# Patient Record
Sex: MALE | Race: BLACK OR AFRICAN AMERICAN | NOT HISPANIC OR LATINO | URBAN - METROPOLITAN AREA
[De-identification: names, ages, dates, MRNs, and addresses within clinical notes are randomized per-mention and may not be internally consistent; named-entity substitution may affect disease eponyms.]

---

## 2023-03-20 DIAGNOSIS — N40.1 BENIGN PROSTATIC HYPERPLASIA WITH LOWER URINARY TRACT SYMPTOMS: ICD-10-CM

## 2023-03-23 RX ORDER — TAMSULOSIN HYDROCHLORIDE 0.4 MG/1
CAPSULE ORAL
Qty: 30 CAPSULE | Refills: 4 | Status: SHIPPED | OUTPATIENT
Start: 2023-03-23 | End: 2024-01-18 | Stop reason: HOSPADM

## 2023-04-25 LAB
ALANINE AMINOTRANSFERASE (SGPT) (U/L) IN SER/PLAS: 11 U/L (ref 10–52)
ALBUMIN (G/DL) IN SER/PLAS: 3.5 G/DL (ref 3.4–5)
ALKALINE PHOSPHATASE (U/L) IN SER/PLAS: 67 U/L (ref 33–136)
ANION GAP IN SER/PLAS: 11 MMOL/L (ref 10–20)
ASPARTATE AMINOTRANSFERASE (SGOT) (U/L) IN SER/PLAS: 11 U/L (ref 9–39)
BILIRUBIN TOTAL (MG/DL) IN SER/PLAS: 0.6 MG/DL (ref 0–1.2)
CALCIUM (MG/DL) IN SER/PLAS: 8.3 MG/DL (ref 8.6–10.3)
CARBON DIOXIDE, TOTAL (MMOL/L) IN SER/PLAS: 27 MMOL/L (ref 21–32)
CHLORIDE (MMOL/L) IN SER/PLAS: 105 MMOL/L (ref 98–107)
CHOLESTEROL (MG/DL) IN SER/PLAS: 130 MG/DL (ref 0–199)
CHOLESTEROL IN HDL (MG/DL) IN SER/PLAS: 44.6 MG/DL
CHOLESTEROL/HDL RATIO: 2.9
CREATININE (MG/DL) IN SER/PLAS: 0.82 MG/DL (ref 0.5–1.3)
ERYTHROCYTE DISTRIBUTION WIDTH (RATIO) BY AUTOMATED COUNT: 12.4 % (ref 11.5–14.5)
ERYTHROCYTE MEAN CORPUSCULAR HEMOGLOBIN CONCENTRATION (G/DL) BY AUTOMATED: 32.2 G/DL (ref 32–36)
ERYTHROCYTE MEAN CORPUSCULAR VOLUME (FL) BY AUTOMATED COUNT: 86 FL (ref 80–100)
ERYTHROCYTES (10*6/UL) IN BLOOD BY AUTOMATED COUNT: 4.94 X10E12/L (ref 4.5–5.9)
GFR MALE: >90 ML/MIN/1.73M2
GLUCOSE (MG/DL) IN SER/PLAS: 165 MG/DL (ref 74–99)
HEMATOCRIT (%) IN BLOOD BY AUTOMATED COUNT: 42.5 % (ref 41–52)
HEMOGLOBIN (G/DL) IN BLOOD: 13.7 G/DL (ref 13.5–17.5)
LDL: 74 MG/DL (ref 0–99)
LEUKOCYTES (10*3/UL) IN BLOOD BY AUTOMATED COUNT: 5.9 X10E9/L (ref 4.4–11.3)
PLATELETS (10*3/UL) IN BLOOD AUTOMATED COUNT: 193 X10E9/L (ref 150–450)
POTASSIUM (MMOL/L) IN SER/PLAS: 4 MMOL/L (ref 3.5–5.3)
PROTEIN TOTAL: 6.7 G/DL (ref 6.4–8.2)
SODIUM (MMOL/L) IN SER/PLAS: 139 MMOL/L (ref 136–145)
TRIGLYCERIDE (MG/DL) IN SER/PLAS: 59 MG/DL (ref 0–149)
UREA NITROGEN (MG/DL) IN SER/PLAS: 14 MG/DL (ref 6–23)
VLDL: 12 MG/DL (ref 0–40)

## 2023-04-28 ENCOUNTER — OFFICE VISIT (OUTPATIENT)
Dept: PRIMARY CARE | Facility: CLINIC | Age: 63
End: 2023-04-28
Payer: COMMERCIAL

## 2023-04-28 VITALS
WEIGHT: 315 LBS | BODY MASS INDEX: 41.75 KG/M2 | SYSTOLIC BLOOD PRESSURE: 134 MMHG | HEART RATE: 70 BPM | OXYGEN SATURATION: 97 % | HEIGHT: 73 IN | DIASTOLIC BLOOD PRESSURE: 76 MMHG

## 2023-04-28 DIAGNOSIS — G47.33 OBSTRUCTIVE SLEEP APNEA: ICD-10-CM

## 2023-04-28 DIAGNOSIS — I48.21 PERMANENT ATRIAL FIBRILLATION (MULTI): ICD-10-CM

## 2023-04-28 DIAGNOSIS — G89.29 CHRONIC LEFT HIP PAIN: Primary | ICD-10-CM

## 2023-04-28 DIAGNOSIS — E66.01 CLASS 3 SEVERE OBESITY DUE TO EXCESS CALORIES WITH SERIOUS COMORBIDITY AND BODY MASS INDEX (BMI) OF 50.0 TO 59.9 IN ADULT (MULTI): ICD-10-CM

## 2023-04-28 DIAGNOSIS — I82.4Y2 DEEP VEIN THROMBOSIS (DVT) OF PROXIMAL VEIN OF LEFT LOWER EXTREMITY, UNSPECIFIED CHRONICITY (MULTI): ICD-10-CM

## 2023-04-28 DIAGNOSIS — I10 HYPERTENSION, UNSPECIFIED TYPE: ICD-10-CM

## 2023-04-28 DIAGNOSIS — M25.552 CHRONIC LEFT HIP PAIN: Primary | ICD-10-CM

## 2023-04-28 DIAGNOSIS — E11.40 TYPE 2 DIABETES MELLITUS WITH DIABETIC NEUROPATHY, WITHOUT LONG-TERM CURRENT USE OF INSULIN (MULTI): ICD-10-CM

## 2023-04-28 DIAGNOSIS — N40.0 BENIGN PROSTATIC HYPERPLASIA, UNSPECIFIED WHETHER LOWER URINARY TRACT SYMPTOMS PRESENT: ICD-10-CM

## 2023-04-28 DIAGNOSIS — E78.5 HYPERLIPIDEMIA, UNSPECIFIED HYPERLIPIDEMIA TYPE: ICD-10-CM

## 2023-04-28 PROBLEM — I82.402 LEFT LEG DVT (MULTI): Status: ACTIVE | Noted: 2023-04-28

## 2023-04-28 PROBLEM — E66.813 CLASS 3 SEVERE OBESITY WITH SERIOUS COMORBIDITY AND BODY MASS INDEX (BMI) OF 50.0 TO 59.9 IN ADULT: Status: ACTIVE | Noted: 2023-04-28

## 2023-04-28 LAB
ALBUMIN (MG/L) IN URINE: <7 MG/L
ALBUMIN/CREATININE (UG/MG) IN URINE: NORMAL UG/MG CRT (ref 0–30)
CREATININE (MG/DL) IN URINE: 123 MG/DL (ref 20–370)

## 2023-04-28 PROCEDURE — 82043 UR ALBUMIN QUANTITATIVE: CPT

## 2023-04-28 PROCEDURE — 3075F SYST BP GE 130 - 139MM HG: CPT | Performed by: INTERNAL MEDICINE

## 2023-04-28 PROCEDURE — 3008F BODY MASS INDEX DOCD: CPT | Performed by: INTERNAL MEDICINE

## 2023-04-28 PROCEDURE — 3078F DIAST BP <80 MM HG: CPT | Performed by: INTERNAL MEDICINE

## 2023-04-28 PROCEDURE — 1036F TOBACCO NON-USER: CPT | Performed by: INTERNAL MEDICINE

## 2023-04-28 PROCEDURE — 99214 OFFICE O/P EST MOD 30 MIN: CPT | Performed by: INTERNAL MEDICINE

## 2023-04-28 PROCEDURE — 82570 ASSAY OF URINE CREATININE: CPT

## 2023-04-28 RX ORDER — LANCETS
EACH MISCELLANEOUS
COMMUNITY
Start: 2022-07-19

## 2023-04-28 RX ORDER — ATORVASTATIN CALCIUM 40 MG/1
40 TABLET, FILM COATED ORAL DAILY
COMMUNITY
End: 2023-04-28 | Stop reason: SDUPTHER

## 2023-04-28 RX ORDER — TERAZOSIN 2 MG/1
2 CAPSULE ORAL NIGHTLY
COMMUNITY
Start: 2022-07-19 | End: 2023-04-28 | Stop reason: SDUPTHER

## 2023-04-28 RX ORDER — CARVEDILOL PHOSPHATE 10 MG/1
10 CAPSULE, EXTENDED RELEASE ORAL DAILY
Qty: 90 CAPSULE | Refills: 3 | Status: SHIPPED | OUTPATIENT
Start: 2023-04-28 | End: 2024-02-13 | Stop reason: ALTCHOICE

## 2023-04-28 RX ORDER — CARVEDILOL PHOSPHATE 10 MG/1
1 CAPSULE, EXTENDED RELEASE ORAL DAILY
COMMUNITY
End: 2023-04-28 | Stop reason: SDUPTHER

## 2023-04-28 RX ORDER — LANCETS 30 GAUGE
EACH MISCELLANEOUS
COMMUNITY
Start: 2022-07-20

## 2023-04-28 RX ORDER — BLOOD SUGAR DIAGNOSTIC
STRIP MISCELLANEOUS
COMMUNITY
Start: 2022-07-19

## 2023-04-28 RX ORDER — RIVAROXABAN 20 MG/1
20 TABLET, FILM COATED ORAL
Qty: 90 TABLET | Refills: 3 | Status: SHIPPED | OUTPATIENT
Start: 2023-04-28 | End: 2024-04-22 | Stop reason: SDUPTHER

## 2023-04-28 RX ORDER — GLIPIZIDE 5 MG/1
5 TABLET ORAL
Qty: 360 TABLET | Refills: 2 | Status: SHIPPED | OUTPATIENT
Start: 2023-04-28 | End: 2023-04-28 | Stop reason: SDUPTHER

## 2023-04-28 RX ORDER — RIVAROXABAN 20 MG/1
1 TABLET, FILM COATED ORAL
COMMUNITY
Start: 2017-10-04 | End: 2023-04-28 | Stop reason: SDUPTHER

## 2023-04-28 RX ORDER — ATORVASTATIN CALCIUM 40 MG/1
40 TABLET, FILM COATED ORAL DAILY
Qty: 90 TABLET | Refills: 2 | Status: SHIPPED | OUTPATIENT
Start: 2023-04-28 | End: 2023-12-07

## 2023-04-28 RX ORDER — TERAZOSIN 2 MG/1
2 CAPSULE ORAL NIGHTLY
Qty: 90 CAPSULE | Refills: 2 | Status: SHIPPED | OUTPATIENT
Start: 2023-04-28 | End: 2023-11-14 | Stop reason: ALTCHOICE

## 2023-04-28 RX ORDER — METOPROLOL TARTRATE 50 MG/1
50 TABLET ORAL 2 TIMES DAILY
Qty: 90 TABLET | Refills: 2 | Status: SHIPPED | OUTPATIENT
Start: 2023-04-28 | End: 2023-08-14

## 2023-04-28 RX ORDER — GLIPIZIDE 5 MG/1
1 TABLET ORAL 2 TIMES DAILY
COMMUNITY
Start: 2022-07-19 | End: 2023-04-28 | Stop reason: DRUGHIGH

## 2023-04-28 RX ORDER — METOPROLOL TARTRATE 50 MG/1
1 TABLET ORAL 2 TIMES DAILY
COMMUNITY
Start: 2021-09-16 | End: 2023-04-28 | Stop reason: SDUPTHER

## 2023-04-28 RX ORDER — GLIPIZIDE 5 MG/1
5 TABLET ORAL
Qty: 360 TABLET | Refills: 2 | Status: SHIPPED | OUTPATIENT
Start: 2023-04-28 | End: 2024-01-10 | Stop reason: WASHOUT

## 2023-04-28 ASSESSMENT — ENCOUNTER SYMPTOMS
SHORTNESS OF BREATH: 0
CONSTITUTIONAL NEGATIVE: 1
INABILITY TO BEAR WEIGHT: 1
CHEST TIGHTNESS: 0
HIP PAIN: 1
NUMBNESS: 0
MUSCLE WEAKNESS: 0
TINGLING: 0
LOSS OF MOTION: 0
COUGH: 0
PALPITATIONS: 0

## 2023-04-28 ASSESSMENT — PATIENT HEALTH QUESTIONNAIRE - PHQ9
1. LITTLE INTEREST OR PLEASURE IN DOING THINGS: NOT AT ALL
SUM OF ALL RESPONSES TO PHQ9 QUESTIONS 1 AND 2: 0
2. FEELING DOWN, DEPRESSED OR HOPELESS: NOT AT ALL

## 2023-04-28 NOTE — PROGRESS NOTES
"Patient ID: Mitch Connor is a 62 y.o. male who presents for Annual Exam.    /76   Pulse 70   Ht 1.854 m (6' 1\")   Wt (!) 195 kg (429 lb 6.4 oz)   SpO2 97%   BMI 56.65 kg/m²     Hip Pain   Incident onset: FEW YRS. The incident occurred at home. The injury mechanism is unknown. The pain is present in the left hip. The quality of the pain is described as aching. The pain is at a severity of 6/10. The pain is moderate. The pain has been Worsening since onset. Associated symptoms include an inability to bear weight. Pertinent negatives include no loss of motion, muscle weakness, numbness or tingling. He reports no foreign bodies present. The symptoms are aggravated by weight bearing and movement. He has tried acetaminophen and rest for the symptoms. The treatment provided mild relief.       Subjective     Review of Systems   Constitutional: Negative.    Respiratory:  Negative for cough, chest tightness and shortness of breath.    Cardiovascular:  Negative for chest pain, palpitations and leg swelling.   Musculoskeletal:         LEFT HIP PAIN , DIFFICULTY WALKING , AND BEARING WEIGHT , 6/10   PT HAD INTRALESIONAL STEROID APPRXM A YR BEFORE UNDER FLOUROSCOPIC GUIDANCE    Neurological:  Negative for tingling and numbness.   All other systems reviewed and are negative.      Objective     Physical Exam    Lab Results   Component Value Date    WBC 5.9 04/25/2023    HGB 13.7 04/25/2023    HCT 42.5 04/25/2023    MCV 86 04/25/2023     04/25/2023           Problem List Items Addressed This Visit          Nervous    Obstructive sleep apnea    Relevant Orders    Referral to Adult Sleep Medicine    Diabetes mellitus with diabetic neuropathy (CMS/HCC)    Relevant Medications    OneTouch UltraSoft Lancets misc    OneTouch Ultra2 Meter misc    OneTouch Ultra Test strip    glipiZIDE (Glucotrol) 5 mg tablet    Other Relevant Orders    Albumin, urine, random       Circulatory    Permanent atrial fibrillation (CMS/HCC) "    Relevant Medications    metoprolol tartrate (Lopressor) 50 mg tablet    carvedilol CR (Coreg CR) 10 mg 24 hr capsule    Xarelto 20 mg tablet    Left leg DVT (CMS/HCC)    Relevant Medications    Xarelto 20 mg tablet       Musculoskeletal    Chronic left hip pain - Primary    Relevant Orders    Referral to Orthopaedic Surgery       Endocrine/Metabolic    Class 3 severe obesity with serious comorbidity and body mass index (BMI) of 50.0 to 59.9 in adult (CMS/[facility])     Other Visit Diagnoses       Hypertension, unspecified type        Relevant Medications    metoprolol tartrate (Lopressor) 50 mg tablet    carvedilol CR (Coreg CR) 10 mg 24 hr capsule    Benign prostatic hyperplasia, unspecified whether lower urinary tract symptoms present        Relevant Medications    terazosin (Hytrin) 2 mg capsule    Hyperlipidemia, unspecified hyperlipidemia type        Relevant Medications    atorvastatin (Lipitor) 40 mg tablet                 A/P         [facility] DR WENDY HALLMAN   SINCE DIABETES UNCONTROLLED TO INCREASE GLIPIZIDE  5MG   2 PILLS BID FILLED   CARVEDILOL CR 10MG 1 PILL EVERY DAY FILLED  METOPROLOL 50MG 1 PILL BID FILLED   ATORVASTATIN 40MG 1 PILL EVERY DAY FILLED   TERAZOSIN 2MG 2 CAPSULE AT NIGHT FILLED  TAMSULOSIN 0.4NG CAPSULE 1 CAPSULE AT NIGHT FILLED   XARELTO 20MG 1 CAPSULE AT NIGHT FILLED   REFFERAL SLEEP MEDICINE   URINE MICROALBUMIN   Discussed the importance of losing weight as more as morbid obesity  Is a significant risk factor for overall physical and mental health  It has any impact on overall cardiovascular cerebrovascular disease  He already had atrial fibrillation it can aggravate the condition  And could be a risk factor for heart failure and sudden death it can also affect sleep apnea which she already had  is an effect on overall morbidity for cancer  It can also have an effect on his chronic low back pain hip pain knee pain  Discussed the importance of controlling diabetes  uncontrolled diabetes is a significant risk for overall cause morbidity and mortality

## 2023-08-11 DIAGNOSIS — I10 HYPERTENSION, UNSPECIFIED TYPE: ICD-10-CM

## 2023-08-11 DIAGNOSIS — I48.21 PERMANENT ATRIAL FIBRILLATION (MULTI): ICD-10-CM

## 2023-08-14 RX ORDER — METOPROLOL TARTRATE 50 MG/1
50 TABLET ORAL 2 TIMES DAILY
Qty: 180 TABLET | Refills: 1 | Status: SHIPPED | OUTPATIENT
Start: 2023-08-14 | End: 2024-04-22 | Stop reason: SDUPTHER

## 2023-09-25 LAB
ERYTHROCYTE DISTRIBUTION WIDTH (RATIO) BY AUTOMATED COUNT: 12.4 % (ref 11.5–14.5)
ERYTHROCYTE MEAN CORPUSCULAR HEMOGLOBIN CONCENTRATION (G/DL) BY AUTOMATED: 32.7 G/DL (ref 32–36)
ERYTHROCYTE MEAN CORPUSCULAR VOLUME (FL) BY AUTOMATED COUNT: 86 FL (ref 80–100)
ERYTHROCYTES (10*6/UL) IN BLOOD BY AUTOMATED COUNT: 4.88 X10E12/L (ref 4.5–5.9)
HEMATOCRIT (%) IN BLOOD BY AUTOMATED COUNT: 41.9 % (ref 41–52)
HEMOGLOBIN (G/DL) IN BLOOD: 13.7 G/DL (ref 13.5–17.5)
LEUKOCYTES (10*3/UL) IN BLOOD BY AUTOMATED COUNT: 7.1 X10E9/L (ref 4.4–11.3)
PLATELETS (10*3/UL) IN BLOOD AUTOMATED COUNT: 181 X10E9/L (ref 150–450)

## 2023-10-09 ENCOUNTER — LAB (OUTPATIENT)
Dept: LAB | Facility: LAB | Age: 63
End: 2023-10-09
Payer: COMMERCIAL

## 2023-10-09 ENCOUNTER — OFFICE VISIT (OUTPATIENT)
Dept: PRIMARY CARE | Facility: CLINIC | Age: 63
End: 2023-10-09
Payer: COMMERCIAL

## 2023-10-09 VITALS
OXYGEN SATURATION: 97 % | BODY MASS INDEX: 56.31 KG/M2 | HEART RATE: 62 BPM | DIASTOLIC BLOOD PRESSURE: 72 MMHG | SYSTOLIC BLOOD PRESSURE: 126 MMHG | WEIGHT: 315 LBS

## 2023-10-09 DIAGNOSIS — R31.0 GROSS HEMATURIA: ICD-10-CM

## 2023-10-09 DIAGNOSIS — R35.0 BENIGN PROSTATIC HYPERPLASIA WITH URINARY FREQUENCY: ICD-10-CM

## 2023-10-09 DIAGNOSIS — E66.01 CLASS 3 SEVERE OBESITY DUE TO EXCESS CALORIES WITH SERIOUS COMORBIDITY AND BODY MASS INDEX (BMI) OF 50.0 TO 59.9 IN ADULT (MULTI): ICD-10-CM

## 2023-10-09 DIAGNOSIS — N40.1 BENIGN PROSTATIC HYPERPLASIA WITH URINARY FREQUENCY: ICD-10-CM

## 2023-10-09 DIAGNOSIS — N20.0 STAGHORN RENAL CALCULUS: ICD-10-CM

## 2023-10-09 DIAGNOSIS — G47.33 OBSTRUCTIVE SLEEP APNEA: ICD-10-CM

## 2023-10-09 DIAGNOSIS — I82.4Y2 DEEP VEIN THROMBOSIS (DVT) OF PROXIMAL VEIN OF LEFT LOWER EXTREMITY, UNSPECIFIED CHRONICITY (MULTI): ICD-10-CM

## 2023-10-09 DIAGNOSIS — E11.40 TYPE 2 DIABETES MELLITUS WITH DIABETIC NEUROPATHY, WITHOUT LONG-TERM CURRENT USE OF INSULIN (MULTI): ICD-10-CM

## 2023-10-09 DIAGNOSIS — I48.21 PERMANENT ATRIAL FIBRILLATION (MULTI): Primary | ICD-10-CM

## 2023-10-09 PROBLEM — I48.20 CHRONIC ATRIAL FIBRILLATION (MULTI): Status: ACTIVE | Noted: 2023-10-09

## 2023-10-09 LAB
APPEARANCE UR: ABNORMAL
BASOPHILS # BLD AUTO: 0.07 X10*3/UL (ref 0–0.1)
BASOPHILS NFR BLD AUTO: 1.1 %
BILIRUB UR STRIP.AUTO-MCNC: NEGATIVE MG/DL
COLOR UR: ABNORMAL
EOSINOPHIL # BLD AUTO: 0.22 X10*3/UL (ref 0–0.7)
EOSINOPHIL NFR BLD AUTO: 3.4 %
ERYTHROCYTE [DISTWIDTH] IN BLOOD BY AUTOMATED COUNT: 12.3 % (ref 11.5–14.5)
GLUCOSE UR STRIP.AUTO-MCNC: ABNORMAL MG/DL
HCT VFR BLD AUTO: 40.7 % (ref 41–52)
HGB BLD-MCNC: 12.9 G/DL (ref 13.5–17.5)
IMM GRANULOCYTES # BLD AUTO: 0.01 X10*3/UL (ref 0–0.7)
IMM GRANULOCYTES NFR BLD AUTO: 0.2 % (ref 0–0.9)
KETONES UR STRIP.AUTO-MCNC: ABNORMAL MG/DL
LEUKOCYTE ESTERASE UR QL STRIP.AUTO: NEGATIVE
LYMPHOCYTES # BLD AUTO: 1.49 X10*3/UL (ref 1.2–4.8)
LYMPHOCYTES NFR BLD AUTO: 22.8 %
MCH RBC QN AUTO: 28.6 PG (ref 26–34)
MCHC RBC AUTO-ENTMCNC: 31.7 G/DL (ref 32–36)
MCV RBC AUTO: 90 FL (ref 80–100)
MONOCYTES # BLD AUTO: 0.66 X10*3/UL (ref 0.1–1)
MONOCYTES NFR BLD AUTO: 10.1 %
NEUTROPHILS # BLD AUTO: 4.08 X10*3/UL (ref 1.2–7.7)
NEUTROPHILS NFR BLD AUTO: 62.4 %
NITRITE UR QL STRIP.AUTO: POSITIVE
NRBC BLD-RTO: 0 /100 WBCS (ref 0–0)
PH UR STRIP.AUTO: 6 [PH]
PLATELET # BLD AUTO: 160 X10*3/UL (ref 150–450)
PMV BLD AUTO: 12.5 FL (ref 7.5–11.5)
PROT UR STRIP.AUTO-MCNC: ABNORMAL MG/DL
RBC # BLD AUTO: 4.51 X10*6/UL (ref 4.5–5.9)
RBC # UR STRIP.AUTO: ABNORMAL /UL
RBC #/AREA URNS AUTO: >20 /HPF
SP GR UR STRIP.AUTO: 1.02
UROBILINOGEN UR STRIP.AUTO-MCNC: 2 MG/DL
WBC # BLD AUTO: 6.5 X10*3/UL (ref 4.4–11.3)
WBC #/AREA URNS AUTO: ABNORMAL /HPF

## 2023-10-09 PROCEDURE — 85025 COMPLETE CBC W/AUTO DIFF WBC: CPT

## 2023-10-09 PROCEDURE — 99213 OFFICE O/P EST LOW 20 MIN: CPT | Performed by: INTERNAL MEDICINE

## 2023-10-09 PROCEDURE — 3078F DIAST BP <80 MM HG: CPT | Performed by: INTERNAL MEDICINE

## 2023-10-09 PROCEDURE — 81001 URINALYSIS AUTO W/SCOPE: CPT

## 2023-10-09 PROCEDURE — 3008F BODY MASS INDEX DOCD: CPT | Performed by: INTERNAL MEDICINE

## 2023-10-09 PROCEDURE — 36415 COLL VENOUS BLD VENIPUNCTURE: CPT

## 2023-10-09 PROCEDURE — 3074F SYST BP LT 130 MM HG: CPT | Performed by: INTERNAL MEDICINE

## 2023-10-09 PROCEDURE — 1036F TOBACCO NON-USER: CPT | Performed by: INTERNAL MEDICINE

## 2023-10-09 ASSESSMENT — PATIENT HEALTH QUESTIONNAIRE - PHQ9
1. LITTLE INTEREST OR PLEASURE IN DOING THINGS: NOT AT ALL
2. FEELING DOWN, DEPRESSED OR HOPELESS: NOT AT ALL
1. LITTLE INTEREST OR PLEASURE IN DOING THINGS: NOT AT ALL
SUM OF ALL RESPONSES TO PHQ9 QUESTIONS 1 AND 2: 0
SUM OF ALL RESPONSES TO PHQ9 QUESTIONS 1 AND 2: 0
2. FEELING DOWN, DEPRESSED OR HOPELESS: NOT AT ALL

## 2023-10-09 ASSESSMENT — ENCOUNTER SYMPTOMS
HEMATURIA: 1
CONSTITUTIONAL NEGATIVE: 1

## 2023-10-09 NOTE — PROGRESS NOTES
Patient ID: Mitch Connor is a 62 y.o. male who presents for Blood in Urine (For 2-3 months).    /72   Pulse 62   Wt (!) 194 kg (426 lb 12.8 oz)   SpO2 97%   BMI 56.31 kg/m²     Blood in Urine  This is a chronic problem. The current episode started more than 1 month ago. The problem is unchanged.     PT SEEN IN ED ON 08/24/2023 FOR SIMILAR PROBLEM AND NOTED TO HAVE STAGHORN CALCULUS RT KIDNEY , NO HYDRONEPHROSIS , PT SEEN DR [facility]  , AND  TOLD TO HAVE BPH , PT HAVING ONGOING  HEMATURIA , NO DIZZINESS , NO LIGHTHEADEDNESS , I OCCASIONALLY FEEL FATIGUE , PT TALKED TO UROLOGY WHO SUGGESTED TO GET CBC   TOO , PT ON XARELTO , PT HAD CBC ON 09/25/2023 HGB/HEMATOCRIT -13.7/41.9 , HIS BLOOD PRESSURE NORMAL TOO     PT HAS ATRIAL FIBRILLATION TOO ON XARELTO         Subjective     Review of Systems   Constitutional: Negative.    Genitourinary:  Positive for hematuria.   All other systems reviewed and are negative.      Objective     Physical Exam  Vitals and nursing note reviewed.   Cardiovascular:      Rate and Rhythm: Normal rate and regular rhythm.      Pulses: Normal pulses.      Heart sounds: Normal heart sounds.   Pulmonary:      Effort: Pulmonary effort is normal.      Breath sounds: Normal breath sounds.   Musculoskeletal:      Right lower leg: No edema.      Left lower leg: No edema.   Skin:     Capillary Refill: Capillary refill takes more than 3 seconds.   Neurological:      General: No focal deficit present.      Mental Status: He is oriented to person, place, and time. Mental status is at baseline.   Psychiatric:         Mood and Affect: Mood normal.         Behavior: Behavior normal.         Thought Content: Thought content normal.         Judgment: Judgment normal.         Lab Results   Component Value Date    WBC 7.1 09/25/2023    HGB 13.7 09/25/2023    HCT 41.9 09/25/2023    MCV 86 09/25/2023     09/25/2023           Problem List Items Addressed This Visit       Obstructive sleep  apnea    Permanent atrial fibrillation (CMS/HCC) - Primary    Left leg DVT (CMS/HCC)    Class 3 severe obesity with serious comorbidity and body mass index (BMI) of 50.0 to 59.9 in adult (CMS/[facility])    Diabetes mellitus with diabetic neuropathy (CMS/[facility])    Gross hematuria    Relevant Orders    CBC and Auto Differential    Urinalysis with Reflex Microscopic    Urine Culture    Benign prostatic hyperplasia with urinary frequency    Staghorn renal calculus            A/P         CBC, URINALYSIS, URINE CULTURE   I TOLD HIM , IF HE FEELS LIGHT HEADED , DIZZY WITH ,LOW BLOOD PRESSURE , NEED TO ATTEND EMERGENCY RIGHT AWAY

## 2023-10-24 PROBLEM — I82.409 DVT (DEEP VENOUS THROMBOSIS) (MULTI): Status: ACTIVE | Noted: 2023-10-24

## 2023-10-24 PROBLEM — H52.03 HYPERMETROPIA, BILATERAL: Status: ACTIVE | Noted: 2023-10-24

## 2023-10-24 PROBLEM — R25.2 LEG CRAMPS: Status: ACTIVE | Noted: 2023-10-24

## 2023-10-24 PROBLEM — R79.89 LOW TESTOSTERONE IN MALE: Status: ACTIVE | Noted: 2023-10-24

## 2023-10-24 PROBLEM — H47.093 ASYMMETRY OF OPTIC NERVE OF BOTH EYES: Status: ACTIVE | Noted: 2023-10-24

## 2023-10-24 PROBLEM — R82.998 CALCIUM OXALATE CRYSTALS PRESENT IN URINE: Status: ACTIVE | Noted: 2023-10-24

## 2023-10-24 PROBLEM — H52.223 REGULAR ASTIGMATISM OF BOTH EYES WITH PRESBYOPIA: Status: ACTIVE | Noted: 2023-10-24

## 2023-10-24 PROBLEM — E66.01 MORBID OBESITY (MULTI): Status: ACTIVE | Noted: 2023-10-24

## 2023-10-24 PROBLEM — E11.9 DIABETES MELLITUS (MULTI): Status: ACTIVE | Noted: 2023-10-24

## 2023-10-24 PROBLEM — N52.9 ED (ERECTILE DYSFUNCTION): Status: ACTIVE | Noted: 2023-10-24

## 2023-10-24 PROBLEM — K11.7 XEROSTOMIA: Status: ACTIVE | Noted: 2023-10-24

## 2023-10-24 PROBLEM — N40.1 BENIGN PROSTATIC HYPERPLASIA WITH LOWER URINARY TRACT SYMPTOMS: Status: ACTIVE | Noted: 2023-10-24

## 2023-10-24 PROBLEM — R06.83 SNORING: Status: ACTIVE | Noted: 2023-10-24

## 2023-10-24 PROBLEM — H52.4 REGULAR ASTIGMATISM OF BOTH EYES WITH PRESBYOPIA: Status: ACTIVE | Noted: 2023-10-24

## 2023-10-24 PROBLEM — M16.12 PRIMARY OSTEOARTHRITIS OF LEFT HIP: Status: ACTIVE | Noted: 2023-10-24

## 2023-10-24 PROBLEM — R31.9 BLOOD IN URINE: Status: ACTIVE | Noted: 2023-10-24

## 2023-10-24 PROBLEM — Z86.711 HISTORY OF PULMONARY EMBOLISM: Status: ACTIVE | Noted: 2023-10-24

## 2023-10-24 PROBLEM — I10 ESSENTIAL HYPERTENSION: Status: ACTIVE | Noted: 2023-10-24

## 2023-10-24 PROBLEM — N20.0 CALCULUS OF KIDNEY: Status: ACTIVE | Noted: 2023-10-24

## 2023-10-24 PROBLEM — E04.1 THYROID NODULE: Status: ACTIVE | Noted: 2023-10-24

## 2023-10-24 PROBLEM — N32.9 BLADDER DISORDER: Status: ACTIVE | Noted: 2023-10-24

## 2023-10-24 PROBLEM — R91.1 LUNG NODULE: Status: ACTIVE | Noted: 2023-10-24

## 2023-10-24 PROBLEM — E78.5 DYSLIPIDEMIA: Status: ACTIVE | Noted: 2023-10-24

## 2023-10-24 PROBLEM — Z79.01 LONG TERM (CURRENT) USE OF ANTICOAGULANTS: Status: ACTIVE | Noted: 2023-10-24

## 2023-10-24 PROBLEM — H52.222 REGULAR ASTIGMATISM, LEFT EYE: Status: ACTIVE | Noted: 2023-10-24

## 2023-10-24 PROBLEM — R73.9 HYPERGLYCEMIA: Status: ACTIVE | Noted: 2023-10-24

## 2023-10-24 PROBLEM — H40.019 OPEN ANGLE GLAUCOMA SUSPECT WITH BORDERLINE FINDINGS AT LOW RISK: Status: ACTIVE | Noted: 2023-10-24

## 2023-10-24 PROBLEM — I48.92 ATRIAL FLUTTER (MULTI): Status: ACTIVE | Noted: 2023-10-24

## 2023-10-25 ENCOUNTER — APPOINTMENT (OUTPATIENT)
Dept: SLEEP MEDICINE | Facility: CLINIC | Age: 63
End: 2023-10-25
Payer: COMMERCIAL

## 2023-11-14 ENCOUNTER — HOSPITAL ENCOUNTER (OUTPATIENT)
Dept: RADIOLOGY | Facility: HOSPITAL | Age: 63
Discharge: HOME | End: 2023-11-14
Payer: COMMERCIAL

## 2023-11-14 ENCOUNTER — OFFICE VISIT (OUTPATIENT)
Dept: CARDIOLOGY | Facility: HOSPITAL | Age: 63
End: 2023-11-14
Payer: COMMERCIAL

## 2023-11-14 VITALS
BODY MASS INDEX: 40.43 KG/M2 | DIASTOLIC BLOOD PRESSURE: 70 MMHG | WEIGHT: 315 LBS | HEART RATE: 81 BPM | HEIGHT: 74 IN | OXYGEN SATURATION: 96 % | SYSTOLIC BLOOD PRESSURE: 114 MMHG

## 2023-11-14 DIAGNOSIS — R06.09 DYSPNEA ON EXERTION: ICD-10-CM

## 2023-11-14 DIAGNOSIS — I48.20 CHRONIC ATRIAL FIBRILLATION (MULTI): Primary | ICD-10-CM

## 2023-11-14 DIAGNOSIS — R06.2 WHEEZING ON EXPIRATION: ICD-10-CM

## 2023-11-14 PROCEDURE — 93005 ELECTROCARDIOGRAM TRACING: CPT | Performed by: NURSE PRACTITIONER

## 2023-11-14 PROCEDURE — 3078F DIAST BP <80 MM HG: CPT | Performed by: NURSE PRACTITIONER

## 2023-11-14 PROCEDURE — 3008F BODY MASS INDEX DOCD: CPT | Performed by: NURSE PRACTITIONER

## 2023-11-14 PROCEDURE — 71046 X-RAY EXAM CHEST 2 VIEWS: CPT | Performed by: RADIOLOGY

## 2023-11-14 PROCEDURE — 99214 OFFICE O/P EST MOD 30 MIN: CPT | Performed by: NURSE PRACTITIONER

## 2023-11-14 PROCEDURE — 1036F TOBACCO NON-USER: CPT | Performed by: NURSE PRACTITIONER

## 2023-11-14 PROCEDURE — 3074F SYST BP LT 130 MM HG: CPT | Performed by: NURSE PRACTITIONER

## 2023-11-14 PROCEDURE — 71046 X-RAY EXAM CHEST 2 VIEWS: CPT

## 2023-11-14 ASSESSMENT — PATIENT HEALTH QUESTIONNAIRE - PHQ9
SUM OF ALL RESPONSES TO PHQ9 QUESTIONS 1 AND 2: 0
1. LITTLE INTEREST OR PLEASURE IN DOING THINGS: NOT AT ALL
2. FEELING DOWN, DEPRESSED OR HOPELESS: NOT AT ALL

## 2023-11-14 ASSESSMENT — ENCOUNTER SYMPTOMS
OCCASIONAL FEELINGS OF UNSTEADINESS: 1
LOSS OF SENSATION IN FEET: 0
DEPRESSION: 0

## 2023-11-14 NOTE — PROGRESS NOTES
Primary Care Physician: Arthur Bond MD  Date of Visit: 11/14/2023 11:40 AM EST  Location of visit: [facility]     Chief Complaint:   Chief Complaint   Patient presents with    Atrial Fibrillation    Hyperlipidemia    Hypertension    Shortness of Breath        HPI / Summary:   Mitch Connor is a 63 y.o. male presents for followup. Seen in collaboration with Dr. Evans. He does not exercise regularly. His dyspnea on exertion walking has been more noticeable. His chronic lower extremity edema is worse. He does admit to adding salt to his food. His activity is most limited by chronic knee pain. He has a cough and reports cold like symptoms over the past 3 days. Denies fevers. The patient denies chest pain, palpitations, lightheadedness, syncope, orthopnea, paroxysmal nocturnal dyspnea, or bleeding problems.                   Past Medical History:  Past Medical History:   Diagnosis Date    Solitary pulmonary nodule     Lung nodule        Past Surgical History:  Past Surgical History:   Procedure Laterality Date    ABLATION OF DYSRHYTHMIC FOCUS  09/25/2018    Catheter Ablation    ACHILLES TENDON SURGERY  09/25/2018    Subcutaneous Tenotomy Of Achilles Tendon          Social History:   reports that he has never smoked. He has never used smokeless tobacco. He reports current alcohol use of about 5.0 standard drinks of alcohol per week. He reports that he does not currently use drugs.     Family History:  family history includes Colon cancer in his father and paternal grandmother; Colonic polyp in his brother, father, and paternal grandmother; Coronary artery disease in his sister.      Allergies:  Allergies   Allergen Reactions    Metformin Myalgia       Outpatient Medications:  Current Outpatient Medications   Medication Instructions    atorvastatin (LIPITOR) 40 mg, oral, Daily    carvedilol CR (COREG CR) 10 mg, oral, Daily    glipiZIDE (GLUCOTROL) 5 mg, oral, 2 times daily before meals    metoprolol  "tartrate (LOPRESSOR) 50 mg, oral, 2 times daily    OneTouch Ultra Test strip TEST BLOOD SUGAR ONCE A DAY    OneTouch Ultra2 Meter misc use as directed.    OneTouch UltraSoft Lancets misc USE TO TEST BLOOD SUGAR ONCE A DAY    tamsulosin (Flomax) 0.4 mg 24 hr capsule TAKE 1 CAPSULE BY MOUTH EVERY DAY    Xarelto 20 mg, oral, Daily with evening meal       Physical Exam:  Vitals:    11/14/23 1220   BP: 145/80   BP Location: Left arm   Patient Position: Sitting   Pulse: 81   SpO2: 96%   Weight: (!) 186 kg (411 lb)   Height: 1.88 m (6' 2\")     Wt Readings from Last 5 Encounters:   11/14/23 (!) 186 kg (411 lb)   10/09/23 (!) 194 kg (426 lb 12.8 oz)   04/28/23 (!) 195 kg (429 lb 6.4 oz)   04/25/23 (!) 196 kg (431 lb)   07/19/22 (!) 188 kg (415 lb)     Body mass index is 52.77 kg/m².     GENERAL: alert, cooperative, pleasant, in no acute distress  SKIN: warm and dry  NECK: Normal JVD, negative HJR  CARDIAC: Regular rate and rhythm with no rubs, murmurs, or gallops  CHEST: Normal respiratory efforts, lungs clear except exp. Wheezing throughout posterior lung fields  ABDOMEN: soft, nontender, nondistended  EXTREMITIES: +2 bilateral lower extremity edema, +2 palpable RP and DP pulses bilaterally       Last Labs:  Recent Labs     10/09/23  1200 09/25/23  1654 08/24/23  1358   WBC 6.5 7.1 7.5   HGB 12.9* 13.7 13.7   HCT 40.7* 41.9 42.1    181 180   MCV 90 86 85     Recent Labs     08/24/23  1358 05/09/23  1604 04/25/23  1050   * 139 139   K 4.2 3.8 4.0    106 105   BUN 10 12 14   CREATININE 0.83 0.88 0.82     CMP -  Lab Results   Component Value Date    CALCIUM 8.4 (L) 08/24/2023    PHOS 2.5 09/14/2021    PROT 6.3 (L) 08/24/2023    ALBUMIN 3.5 08/24/2023    AST 16 08/24/2023    ALT 15 08/24/2023    ALKPHOS 63 08/24/2023    BILITOT 1.0 08/24/2023       LIPID PANEL -   Lab Results   Component Value Date    CHOL 130 04/25/2023    HDL 44.6 04/25/2023    LDLF 74 04/25/2023    TRIG 59 04/25/2023       Lab Results "   Component Value Date     (H) 08/18/2018    HGBA1C 8.5 (A) 07/19/2022    HGBA1C 7.1 (H) 05/08/2019       Last Cardiology Tests:  ECG:  Obtained and reviewed EKG- normal sinus rhythm non specific ST abnormality    Echo:  Echo Results:  9/24/21  CONCLUSIONS:   1. The left ventricular systolic function is low normal with a 50-55% estimated ejection fraction.   2. Poorly visualized anatomical structures due to suboptimal image quality.             Assessment/Plan   Diagnoses and all orders for this visit:  Chronic atrial fibrillation (CMS/HCC)  -     ECG 12 lead (Clinic Performed)  Dyspnea on exertion  -     XR chest 2 views; Future  -     Transthoracic Echo (TTE) Complete; Future  Wheezing on expiration  -     XR chest 2 views; Future    In summary Mr. 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   His chronic dyspnea on exertion has been more noticeable. He does have expiratory wheezing posteriorly throughout lung fields and admits to cough over the past 3 days. I have ordered a chest xray and echocardiogram for surveillance of LV function. I did discuss trying a diuretic to see if this helped with his lower extremity edema. He does not desire to do such at this time. His filling pressures on clinical exam are normal. I have encouraged him to continue weight loss efforts and increase physical activity. We did discuss benefits of weight loss. I did suggest [facility] clinic and GLP1 inhibitor. Patient has decline.  He will continue current cardiovascular medications. He will follow up in 3 months. He will call if symptoms get worse.        Orders:  No orders of the defined types were placed in this encounter.     Followup  Appts:  Future Appointments   Date Time Provider Department Center   1/10/2024  1:00 PM JUMANA Garcia [id number]           ____________________________________________________________  JUMANA Cornejo  [facility]

## 2023-11-14 NOTE — PATIENT INSTRUCTIONS
Chest xray   Echo  Continue current cardiovascular medications  Follow up in 3 months  If symptoms get worse please call our office

## 2023-11-28 ENCOUNTER — HOSPITAL ENCOUNTER (OUTPATIENT)
Dept: CARDIOLOGY | Facility: HOSPITAL | Age: 63
Discharge: HOME | End: 2023-11-28
Payer: COMMERCIAL

## 2023-11-28 VITALS
DIASTOLIC BLOOD PRESSURE: 70 MMHG | BODY MASS INDEX: 40.43 KG/M2 | SYSTOLIC BLOOD PRESSURE: 114 MMHG | WEIGHT: 315 LBS | HEIGHT: 74 IN

## 2023-11-28 DIAGNOSIS — R06.09 DYSPNEA ON EXERTION: ICD-10-CM

## 2023-11-28 PROCEDURE — 93306 TTE W/DOPPLER COMPLETE: CPT

## 2023-11-28 PROCEDURE — 93306 TTE W/DOPPLER COMPLETE: CPT | Performed by: INTERNAL MEDICINE

## 2023-11-28 PROCEDURE — 2500000004 HC RX 250 GENERAL PHARMACY W/ HCPCS (ALT 636 FOR OP/ED): Performed by: INTERNAL MEDICINE

## 2023-11-28 RX ADMIN — PERFLUTREN 2 ML OF DILUTION: 6.52 INJECTION, SUSPENSION INTRAVENOUS at 12:20

## 2023-11-30 LAB
AORTIC VALVE MEAN GRADIENT: 2.7
AORTIC VALVE PEAK VELOCITY: 1.14
AV PEAK GRADIENT: 5.2
AVA (PEAK VEL): 4.69
AVA (VTI): 5.19
LEFT ATRIUM VOLUME AREA LENGTH INDEX BSA: 36.5
LEFT VENTRICLE INTERNAL DIMENSION DIASTOLE: 5.9 (ref 3.5–6)
LEFT VENTRICULAR OUTFLOW TRACT DIAMETER: 2.5
MITRAL VALVE E/A RATIO: 2.85
MITRAL VALVE E/E' RATIO: 14.25
RIGHT VENTRICLE FREE WALL PEAK S': 17
TRICUSPID ANNULAR PLANE SYSTOLIC EXCURSION: 2.3

## 2023-12-09 ENCOUNTER — APPOINTMENT (OUTPATIENT)
Dept: RADIOLOGY | Facility: HOSPITAL | Age: 63
End: 2023-12-09
Payer: COMMERCIAL

## 2023-12-09 ENCOUNTER — HOSPITAL ENCOUNTER (EMERGENCY)
Facility: HOSPITAL | Age: 63
Discharge: HOME | End: 2023-12-09
Attending: EMERGENCY MEDICINE
Payer: COMMERCIAL

## 2023-12-09 VITALS
TEMPERATURE: 97 F | BODY MASS INDEX: 40.43 KG/M2 | WEIGHT: 315 LBS | OXYGEN SATURATION: 98 % | SYSTOLIC BLOOD PRESSURE: 138 MMHG | DIASTOLIC BLOOD PRESSURE: 76 MMHG | HEIGHT: 74 IN | HEART RATE: 69 BPM | RESPIRATION RATE: 18 BRPM

## 2023-12-09 DIAGNOSIS — N23 RENAL COLIC: Primary | ICD-10-CM

## 2023-12-09 LAB
ALBUMIN SERPL BCP-MCNC: 3.6 G/DL (ref 3.4–5)
ALP SERPL-CCNC: 59 U/L (ref 33–136)
ALT SERPL W P-5'-P-CCNC: 12 U/L (ref 10–52)
AMORPH CRY #/AREA UR COMP ASSIST: ABNORMAL /HPF
ANION GAP SERPL CALC-SCNC: 14 MMOL/L (ref 10–20)
APPEARANCE UR: ABNORMAL
AST SERPL W P-5'-P-CCNC: 12 U/L (ref 9–39)
BACTERIA #/AREA URNS AUTO: ABNORMAL /HPF
BASOPHILS # BLD AUTO: 0.07 X10*3/UL (ref 0–0.1)
BASOPHILS NFR BLD AUTO: 0.9 %
BILIRUB SERPL-MCNC: 0.8 MG/DL (ref 0–1.2)
BILIRUB UR STRIP.AUTO-MCNC: NEGATIVE MG/DL
BUN SERPL-MCNC: 13 MG/DL (ref 6–23)
CALCIUM SERPL-MCNC: 8.7 MG/DL (ref 8.6–10.3)
CHLORIDE SERPL-SCNC: 104 MMOL/L (ref 98–107)
CO2 SERPL-SCNC: 24 MMOL/L (ref 21–32)
COLOR UR: ABNORMAL
CREAT SERPL-MCNC: 1.1 MG/DL (ref 0.5–1.3)
EOSINOPHIL # BLD AUTO: 0.13 X10*3/UL (ref 0–0.7)
EOSINOPHIL NFR BLD AUTO: 1.6 %
ERYTHROCYTE [DISTWIDTH] IN BLOOD BY AUTOMATED COUNT: 11.9 % (ref 11.5–14.5)
GFR SERPL CREATININE-BSD FRML MDRD: 76 ML/MIN/1.73M*2
GLUCOSE SERPL-MCNC: 161 MG/DL (ref 74–99)
GLUCOSE UR STRIP.AUTO-MCNC: NEGATIVE MG/DL
HCT VFR BLD AUTO: 40.4 % (ref 41–52)
HGB BLD-MCNC: 13 G/DL (ref 13.5–17.5)
HYALINE CASTS #/AREA URNS AUTO: ABNORMAL /LPF
IMM GRANULOCYTES # BLD AUTO: 0.02 X10*3/UL (ref 0–0.7)
IMM GRANULOCYTES NFR BLD AUTO: 0.2 % (ref 0–0.9)
KETONES UR STRIP.AUTO-MCNC: NEGATIVE MG/DL
LEUKOCYTE ESTERASE UR QL STRIP.AUTO: NEGATIVE
LIPASE SERPL-CCNC: 59 U/L (ref 9–82)
LYMPHOCYTES # BLD AUTO: 2.33 X10*3/UL (ref 1.2–4.8)
LYMPHOCYTES NFR BLD AUTO: 28.7 %
MCH RBC QN AUTO: 26.9 PG (ref 26–34)
MCHC RBC AUTO-ENTMCNC: 32.2 G/DL (ref 32–36)
MCV RBC AUTO: 84 FL (ref 80–100)
MONOCYTES # BLD AUTO: 0.89 X10*3/UL (ref 0.1–1)
MONOCYTES NFR BLD AUTO: 11 %
MUCOUS THREADS #/AREA URNS AUTO: ABNORMAL /LPF
NEUTROPHILS # BLD AUTO: 4.68 X10*3/UL (ref 1.2–7.7)
NEUTROPHILS NFR BLD AUTO: 57.6 %
NITRITE UR QL STRIP.AUTO: NEGATIVE
NRBC BLD-RTO: 0 /100 WBCS (ref 0–0)
PH UR STRIP.AUTO: 5 [PH]
PLATELET # BLD AUTO: 209 X10*3/UL (ref 150–450)
POTASSIUM SERPL-SCNC: 3.9 MMOL/L (ref 3.5–5.3)
PROT SERPL-MCNC: 6.2 G/DL (ref 6.4–8.2)
PROT UR STRIP.AUTO-MCNC: ABNORMAL MG/DL
RBC # BLD AUTO: 4.83 X10*6/UL (ref 4.5–5.9)
RBC # UR STRIP.AUTO: ABNORMAL /UL
RBC #/AREA URNS AUTO: >20 /HPF
SODIUM SERPL-SCNC: 138 MMOL/L (ref 136–145)
SP GR UR STRIP.AUTO: 1.02
SQUAMOUS #/AREA URNS AUTO: ABNORMAL /HPF
UROBILINOGEN UR STRIP.AUTO-MCNC: <2 MG/DL
WBC # BLD AUTO: 8.1 X10*3/UL (ref 4.4–11.3)
WBC #/AREA URNS AUTO: ABNORMAL /HPF

## 2023-12-09 PROCEDURE — 96361 HYDRATE IV INFUSION ADD-ON: CPT

## 2023-12-09 PROCEDURE — 74177 CT ABD & PELVIS W/CONTRAST: CPT

## 2023-12-09 PROCEDURE — 36415 COLL VENOUS BLD VENIPUNCTURE: CPT | Performed by: PHYSICIAN ASSISTANT

## 2023-12-09 PROCEDURE — 96360 HYDRATION IV INFUSION INIT: CPT

## 2023-12-09 PROCEDURE — 83690 ASSAY OF LIPASE: CPT | Performed by: PHYSICIAN ASSISTANT

## 2023-12-09 PROCEDURE — 80053 COMPREHEN METABOLIC PANEL: CPT | Performed by: PHYSICIAN ASSISTANT

## 2023-12-09 PROCEDURE — 85025 COMPLETE CBC W/AUTO DIFF WBC: CPT | Performed by: PHYSICIAN ASSISTANT

## 2023-12-09 PROCEDURE — 2550000001 HC RX 255 CONTRASTS: Performed by: EMERGENCY MEDICINE

## 2023-12-09 PROCEDURE — 2500000001 HC RX 250 WO HCPCS SELF ADMINISTERED DRUGS (ALT 637 FOR MEDICARE OP): Performed by: EMERGENCY MEDICINE

## 2023-12-09 PROCEDURE — 2500000004 HC RX 250 GENERAL PHARMACY W/ HCPCS (ALT 636 FOR OP/ED): Performed by: PHYSICIAN ASSISTANT

## 2023-12-09 PROCEDURE — 81001 URINALYSIS AUTO W/SCOPE: CPT | Performed by: EMERGENCY MEDICINE

## 2023-12-09 PROCEDURE — 99284 EMERGENCY DEPT VISIT MOD MDM: CPT | Mod: 25 | Performed by: EMERGENCY MEDICINE

## 2023-12-09 RX ORDER — SODIUM CHLORIDE 9 MG/ML
125 INJECTION, SOLUTION INTRAVENOUS CONTINUOUS
Status: DISCONTINUED | OUTPATIENT
Start: 2023-12-09 | End: 2023-12-09 | Stop reason: HOSPADM

## 2023-12-09 RX ORDER — OXYCODONE AND ACETAMINOPHEN 5; 325 MG/1; MG/1
1 TABLET ORAL EVERY 8 HOURS PRN
Qty: 12 TABLET | Refills: 0 | Status: SHIPPED | OUTPATIENT
Start: 2023-12-09 | End: 2023-12-13

## 2023-12-09 RX ORDER — NAPROXEN 500 MG/1
500 TABLET ORAL
Qty: 30 TABLET | Refills: 0 | Status: SHIPPED | OUTPATIENT
Start: 2023-12-09 | End: 2023-12-24

## 2023-12-09 RX ORDER — TAMSULOSIN HYDROCHLORIDE 0.4 MG/1
0.4 CAPSULE ORAL DAILY
Qty: 15 CAPSULE | Refills: 0 | Status: SHIPPED | OUTPATIENT
Start: 2023-12-09 | End: 2023-12-24

## 2023-12-09 RX ORDER — ONDANSETRON HYDROCHLORIDE 8 MG/1
8 TABLET, FILM COATED ORAL EVERY 8 HOURS PRN
Qty: 15 TABLET | Refills: 0 | Status: SHIPPED | OUTPATIENT
Start: 2023-12-09 | End: 2024-02-13 | Stop reason: ALTCHOICE

## 2023-12-09 RX ORDER — MORPHINE SULFATE 4 MG/ML
4 INJECTION, SOLUTION INTRAMUSCULAR; INTRAVENOUS ONCE
Status: DISCONTINUED | OUTPATIENT
Start: 2023-12-09 | End: 2023-12-09 | Stop reason: HOSPADM

## 2023-12-09 RX ORDER — OXYCODONE AND ACETAMINOPHEN 5; 325 MG/1; MG/1
2 TABLET ORAL ONCE
Status: COMPLETED | OUTPATIENT
Start: 2023-12-09 | End: 2023-12-09

## 2023-12-09 RX ADMIN — OXYCODONE HYDROCHLORIDE AND ACETAMINOPHEN 2 TABLET: 5; 325 TABLET ORAL at 13:21

## 2023-12-09 RX ADMIN — IOHEXOL 75 ML: 350 INJECTION, SOLUTION INTRAVENOUS at 09:42

## 2023-12-09 RX ADMIN — SODIUM CHLORIDE 125 ML/HR: 9 INJECTION, SOLUTION INTRAVENOUS at 08:21

## 2023-12-09 ASSESSMENT — PAIN - FUNCTIONAL ASSESSMENT: PAIN_FUNCTIONAL_ASSESSMENT: 0-10

## 2023-12-09 ASSESSMENT — PAIN SCALES - GENERAL: PAINLEVEL_OUTOF10: 9

## 2023-12-09 ASSESSMENT — COLUMBIA-SUICIDE SEVERITY RATING SCALE - C-SSRS
1. IN THE PAST MONTH, HAVE YOU WISHED YOU WERE DEAD OR WISHED YOU COULD GO TO SLEEP AND NOT WAKE UP?: NO
2. HAVE YOU ACTUALLY HAD ANY THOUGHTS OF KILLING YOURSELF?: NO
6. HAVE YOU EVER DONE ANYTHING, STARTED TO DO ANYTHING, OR PREPARED TO DO ANYTHING TO END YOUR LIFE?: NO

## 2023-12-09 NOTE — ED PROVIDER NOTES
HPI   Chief Complaint   Patient presents with    Flank Pain    Back Pain    Blood in Urine       63-year-old male presents with right flank pain began this morning when he awoke.  He did notice blood in his urine yesterday.  Known history of staghorn calculus right kidney.  Never passed a kidney stone.  Prior CT imaging in August.  Also has a known enlarged prostate with urinary hesitancy.  Denies retention.  Indicates constant pain in his right flank area nonradiating.  No dysuria frequency or urgency.  No recent fever.  No nausea vomiting.      History provided by:  Patient   used: No                        No data recorded                Patient History   Past Medical History:   Diagnosis Date    Solitary pulmonary nodule     Lung nodule     Past Surgical History:   Procedure Laterality Date    ABLATION OF DYSRHYTHMIC FOCUS  09/25/2018    Catheter Ablation    ACHILLES TENDON SURGERY  09/25/2018    Subcutaneous Tenotomy Of Achilles Tendon     Family History   Problem Relation Name Age of Onset    Colonic polyp Father      Colon cancer Father      Coronary artery disease Sister      Colonic polyp Brother      Colonic polyp Paternal Grandmother      Colon cancer Paternal Grandmother       Social History     Tobacco Use    Smoking status: Never    Smokeless tobacco: Never   Substance Use Topics    Alcohol use: Yes     Alcohol/week: 5.0 standard drinks of alcohol     Types: 5 Cans of beer per week    Drug use: Not Currently       Physical Exam   ED Triage Vitals [12/09/23 0752]   Temp Heart Rate Resp BP   36.1 °C (97 °F) 68 18 (!) 151/95      SpO2 Temp Source Heart Rate Source Patient Position   97 % Temporal Monitor Sitting      BP Location FiO2 (%)     Left arm --       Physical Exam  Vitals and nursing note reviewed.   Constitutional:       General: He is not in acute distress.     Appearance: Normal appearance. He is obese. He is not ill-appearing, toxic-appearing or diaphoretic.   HENT:       Head: Normocephalic and atraumatic.   Cardiovascular:      Rate and Rhythm: Normal rate and regular rhythm.   Pulmonary:      Effort: Pulmonary effort is normal.      Breath sounds: Normal breath sounds.   Abdominal:      General: Abdomen is flat.      Palpations: Abdomen is soft.      Tenderness: There is no abdominal tenderness. There is no right CVA tenderness or left CVA tenderness.      Comments: Right flank pain without tenderness.  No rash at the site of pain.   Musculoskeletal:         General: Normal range of motion.      Cervical back: Normal range of motion and neck supple.   Skin:     General: Skin is warm and dry.   Neurological:      General: No focal deficit present.      Mental Status: He is alert and oriented to person, place, and time.   Psychiatric:         Mood and Affect: Mood normal.         Behavior: Behavior normal.         Thought Content: Thought content normal.         Judgment: Judgment normal.         ED Course & MDM   ED Course as of 12/10/23 0152   Sat Dec 09, 2023   0919 Glucose 161.  Urinalysis with greater than 20 RBC 1-5 WBC.  CBC normal white count.  Chemistries normal kidney function.  Glucose 161.  Lipase 59. [GA]   1057 CT imaging consistent with obstructing calculus right UVJ. [GA]      ED Course User Index  [GA] Aravind Mcgee PA-C         Diagnoses as of 12/10/23 0152   Renal colic     CT abdomen pelvis w IV contrast   Final Result   Obstructing 1.1 x 1.9 cm calculus at the right UPJ level causing mild   right hydronephrosis.        Additional nonobstructing right renal calculi.        Mild colonic diverticulosis without acute diverticulitis.        Normal appendix. No bowel obstruction.        MACRO:   None.        Signed by: Reed Boss 12/9/2023 10:24 AM   Dictation workstation:   [id number]         Labs Reviewed   CBC WITH AUTO DIFFERENTIAL - Abnormal       Result Value    WBC 8.1      nRBC 0.0      RBC 4.83      Hemoglobin 13.0 (*)     Hematocrit 40.4 (*)      MCV 84      MCH 26.9      MCHC 32.2      RDW 11.9      Platelets 209      Neutrophils % 57.6      Immature Granulocytes %, Automated 0.2      Lymphocytes % 28.7      Monocytes % 11.0      Eosinophils % 1.6      Basophils % 0.9      Neutrophils Absolute 4.68      Immature Granulocytes Absolute, Automated 0.02      Lymphocytes Absolute 2.33      Monocytes Absolute 0.89      Eosinophils Absolute 0.13      Basophils Absolute 0.07     COMPREHENSIVE METABOLIC PANEL - Abnormal    Glucose 161 (*)     Sodium 138      Potassium 3.9      Chloride 104      Bicarbonate 24      Anion Gap 14      Urea Nitrogen 13      Creatinine 1.10      eGFR 76      Calcium 8.7      Albumin 3.6      Alkaline Phosphatase 59      Total Protein 6.2 (*)     AST 12      Bilirubin, Total 0.8      ALT 12     URINALYSIS WITH REFLEX MICROSCOPIC AND CULTURE - Abnormal    Color, Urine Mindy (*)     Appearance, Urine Cloudy (*)     Specific Gravity, Urine 1.019      pH, Urine 5.0      Protein, Urine 100 (2+) (*)     Glucose, Urine NEGATIVE      Blood, Urine LARGE (3+) (*)     Ketones, Urine NEGATIVE      Bilirubin, Urine NEGATIVE      Urobilinogen, Urine <2.0      Nitrite, Urine NEGATIVE      Leukocyte Esterase, Urine NEGATIVE     URINALYSIS MICROSCOPIC WITH REFLEX CULTURE - Abnormal    WBC, Urine 1-5      RBC, Urine >20 (*)     Squamous Epithelial Cells, Urine 1-9 (SPARSE)      Bacteria, Urine 1+ (*)     Mucus, Urine 1+      Hyaline Casts, Urine 1+ (*)     Amorphous Crystals, Urine 2+     LIPASE - Normal    Lipase 59      Narrative:     Venipuncture immediately after or during the administration of Metamizole may lead to falsely low results. Testing should be performed immediately prior to Metamizole dosing.   URINALYSIS WITH REFLEX MICROSCOPIC AND CULTURE    Narrative:     The following orders were created for panel order Urinalysis with Reflex Microscopic and Culture.  Procedure                               Abnormality         Status                      ---------                               -----------         ------                     Urinalysis with Reflex M...[[id number]]  Abnormal            Final result               Extra Urine Gray Tube[[id number]]                                                         Please view results for these tests on the individual orders.   EXTRA URINE GRAY TUBE      Medical Decision Making  Patient inclined pain medication initial evaluation.    Labs reviewed EMR.  CT imaging consistent with obstructing stone right UVJ with mild hydronephrosis.    The patient has also been seen and evaluated by the ER physician.  Will reevaluate after diagnostic studies for further diagnosis treatment disposition.    Chief complaint: Right-sided flank pain    History of present illness: Patient is a 63-year-old male with history of diabetes hypertension DVT currently on anticoagulation therapy presenting to the emergency department with complaints of right-sided flank pain as well as bleeding in his urine.  According to the patient, he began to experience pain in his right flank earlier this morning.  The patient states that he has a known history of a kidney stone on that side.  The patient denies any fever with this.  The patient admits that he has been urinating some blood which started today as well.  The patient states that he is never had symptoms like this in the past.  Concern, the patient presents to the emergency department for further evaluation.    Physical examination: General: Patient is an age-appropriate well-appearing male resting comfortably in the examination table  Cardiovascular: Patient has a regular rate and rhythm  Lungs: Lungs are clear to auscultation bilaterally  Abdomen: Patient's abdomen is soft nontender nondistended.  Patient has normal bowel sounds. There is no guarding or rebound tenderness.  Neuro: Patient is alert he moves all 4 extremities spontaneously there are no lateralizing neurologic deficits cranial  nerves III through XII are intact.    Medical decision making: Patient remained stable throughout his time in the emergency department.  CBC demonstrated no significant acute abnormalities Chem-7 and LFTs were all within normal limits urinalysis demonstrated presence of blood and no other significant abnormalities lipase was within normal limits.  CAT scan the patient's abdomen pelvis with IV contrast demonstrated presence of a large 1 x 2 renal calculus of the right UPJ causing right-sided hydronephrosis with no other significant acute abnormalities.    Patient presents to the emergency department today with renal colic.  Workup was performed as above.  The patient was reassured and was explained the patient that he will be unable to pass his kidney stone is important that he follows up with urology soon as possible he was instructed to return to the emergency department for worsening symptoms particularly fever.  The patient expressed understanding he was given a prescription for Percocet tamsulosin and naproxen for home use the patient was then discharged home in otherwise stable condition.    Amount and/or Complexity of Data Reviewed  Labs: ordered. Decision-making details documented in ED Course.  Radiology: ordered. Decision-making details documented in ED Course.        Procedure  Procedures     Isma Cardona MD  12/10/23 0154

## 2023-12-19 ENCOUNTER — ANESTHESIA EVENT (OUTPATIENT)
Dept: OPERATING ROOM | Facility: HOSPITAL | Age: 63
End: 2023-12-19
Payer: COMMERCIAL

## 2023-12-21 ENCOUNTER — ANESTHESIA (OUTPATIENT)
Dept: OPERATING ROOM | Facility: HOSPITAL | Age: 63
End: 2023-12-21
Payer: COMMERCIAL

## 2024-01-10 ENCOUNTER — OFFICE VISIT (OUTPATIENT)
Dept: SLEEP MEDICINE | Facility: CLINIC | Age: 64
End: 2024-01-10
Payer: COMMERCIAL

## 2024-01-10 VITALS
HEART RATE: 76 BPM | DIASTOLIC BLOOD PRESSURE: 84 MMHG | SYSTOLIC BLOOD PRESSURE: 162 MMHG | HEIGHT: 74 IN | TEMPERATURE: 97.1 F | WEIGHT: 315 LBS | BODY MASS INDEX: 40.43 KG/M2

## 2024-01-10 DIAGNOSIS — G47.33 OBSTRUCTIVE SLEEP APNEA: Primary | ICD-10-CM

## 2024-01-10 PROCEDURE — 3079F DIAST BP 80-89 MM HG: CPT | Performed by: NURSE PRACTITIONER

## 2024-01-10 PROCEDURE — 3008F BODY MASS INDEX DOCD: CPT | Performed by: NURSE PRACTITIONER

## 2024-01-10 PROCEDURE — 99204 OFFICE O/P NEW MOD 45 MIN: CPT | Performed by: NURSE PRACTITIONER

## 2024-01-10 PROCEDURE — 3077F SYST BP >= 140 MM HG: CPT | Performed by: NURSE PRACTITIONER

## 2024-01-10 PROCEDURE — 1036F TOBACCO NON-USER: CPT | Performed by: NURSE PRACTITIONER

## 2024-01-10 ASSESSMENT — SLEEP AND FATIGUE QUESTIONNAIRES
HOW LIKELY ARE YOU TO NOD OFF OR FALL ASLEEP IN A CAR, WHILE STOPPED FOR A FEW MINUTES IN TRAFFIC: WOULD NEVER DOZE
HOW LIKELY ARE YOU TO NOD OFF OR FALL ASLEEP WHILE LYING DOWN TO REST IN THE AFTERNOON WHEN CIRCUMSTANCES PERMIT: HIGH CHANCE OF DOZING
ESS-CHAD TOTAL SCORE: 9
WORRIED_DISTRESSED_DUE_TO_SLEEP: A LITTLE
SLEEP_PROBLEM_NOTICEABLE_TO_OTHERS: A LITTLE
HOW LIKELY ARE YOU TO NOD OFF OR FALL ASLEEP WHEN YOU ARE A PASSENGER IN A CAR FOR AN HOUR WITHOUT A BREAK: WOULD NEVER DOZE
WAKING_TOO_EARLY: MILD
SITING INACTIVE IN A PUBLIC PLACE LIKE A CLASS ROOM OR A MOVIE THEATER: WOULD NEVER DOZE
HOW LIKELY ARE YOU TO NOD OFF OR FALL ASLEEP WHILE SITTING QUIETLY AFTER LUNCH WITHOUT ALCOHOL: MODERATE CHANCE OF DOZING
SATISFACTION_WITH_CURRENT_SLEEP_PATTERN: SATISFIED
SLEEP_PROBLEM_INTERFERES_DAILY_ACTIVITIES: A LITTLE
HOW LIKELY ARE YOU TO NOD OFF OR FALL ASLEEP WHILE SITTING AND READING: SLIGHT CHANCE OF DOZING
HOW LIKELY ARE YOU TO NOD OFF OR FALL ASLEEP WHILE SITTING AND TALKING TO SOMEONE: WOULD NEVER DOZE
HOW LIKELY ARE YOU TO NOD OFF OR FALL ASLEEP WHILE WATCHING TV: HIGH CHANCE OF DOZING

## 2024-01-10 NOTE — PROGRESS NOTES
Patient: Mitch Connor    [id number]  : 1960 -- AGE 63 y.o.    Provider: JUMANA Garcia     Location [facility]   Service Date: 1/10/2024              [facility]  New Visit Note      HISTORY OF PRESENT ILLNESS     The patient's referring provider is: No ref. provider found    HISTORY OF PRESENT ILLNESS   Mitch Connor is a 63 y.o. male who presents to a [facility] for a sleep medicine evaluation with concerns of LACEY. Retired . Worked for [employer] in [employer].     The patient has h/o atrial flutter/ afib, HTN, dyslipidemia, DVT, PE, obesity, DM2, thyroid nodule, BPH, arthritis, LACEY.    Past Sleep History  Patient has the following sleep-related diagnoses: split night sleep study in 2019 showed severe sleep apnea with an AHI of 57.2 and SpO2 mary carmen of 92%.  PAP was titrated from 4 to 6 cwp.   Total sleep time was 65 minutes for diagnostic portion and 11.5 minutes for titration portion.    Current History    On today's visit, the patient reports knowing he has sleep apnea. Was encouraged by his PCP to come to appointment today. Feels he sleeps pretty well. Usually sleeps on his stomach. Falls asleep easily, wakes 1-2x for bathroom and returns to sleep easily. Occasionally he notes he nods off watching TV. My sleep in his recliner for a while. Sometimes wakes himself snoring, gasping, choking in the recliner but does not tend to do so if he is in bed.  Attempted sleep testing a few years ago. States he could not sleep at the lab. Was asked to sleep on his back, which he normally does not do. Was placed on CPAP later in the night and did not like how it felt. Does not think he could use a PAP machine if ordered for him. Does not want a PAP to be issued and then he not wear it either. Asking about PAP alternatives   Has lost about 40 lb since last sleep study due to intentional diet  changes. Hoping to continue.   Denies sleepy driving, issues with daytime functioning. Notes easy propensity to nod off watching TV as primary complaint.   Hx of ablation for afib    Sleep schedule  on weekdays / work days:  Usual Bedtime:    1:30 AM  Falls asleep around:  10 min  # of awakenings: 2x bathroom  Wake time:  9 AM    Naps: occasionally nods during TV    Preferred sleeping position: stomach, recliner if watching TV     Sleep-related ROS:    Problems going to sleep: No problems going to sleep    Sleep Maintenance: wakes up about 2 times per night  Problems staying asleep Problems Staying Asleep: nocturia and breathing problems    Breathing during sleep: snoring, snorting during sleep, and gasping/choking for air    RLS screen: denies     Sleep-related behaviors:   dreams upon falling asleep    Daytime Symptoms  On awakening patient reports: morning dry mouth    ESS: 9   YUE: 5  FOSQ:  --      REVIEW OF SYSTEMS     REVIEW OF SYSTEMS  Review of Systems   All other systems reviewed and are negative.      ALLERGIES AND MEDICATIONS     ALLERGIES  Allergies   Allergen Reactions    Metformin Myalgia       MEDICATIONS  Current Outpatient Medications   Medication Sig Dispense Refill    atorvastatin (Lipitor) 40 mg tablet Take 1 tablet (40 mg) by mouth once daily. 90 tablet 1    metoprolol tartrate (Lopressor) 50 mg tablet Take 1 tablet by mouth 2 times a day. 180 tablet 1    ondansetron (Zofran) 8 mg tablet Take 1 tablet (8 mg) by mouth every 8 hours if needed for nausea or vomiting for up to 15 doses. 15 tablet 0    OneTouch Ultra Test strip TEST BLOOD SUGAR ONCE A DAY      OneTouch Ultra2 Meter misc use as directed.      OneTouch UltraSoft Lancets misc USE TO TEST BLOOD SUGAR ONCE A DAY      tamsulosin (Flomax) 0.4 mg 24 hr capsule TAKE 1 CAPSULE BY MOUTH EVERY DAY 30 capsule 4    carvedilol CR (Coreg CR) 10 mg 24 hr capsule Take 1 capsule (10 mg) by mouth once daily. 90 capsule 3    Xarelto 20 mg tablet Take 1  "tablet (20 mg) by mouth once daily in the evening. Take with meals. 90 tablet 3     No current facility-administered medications for this visit.         PAST HISTORY     PAST MEDICAL HISTORY  Past Medical History:   Diagnosis Date    Solitary pulmonary nodule     Lung nodule       PAST SURGICAL HISTORY:  Past Surgical History:   Procedure Laterality Date    ABLATION OF DYSRHYTHMIC FOCUS  09/25/2018    Catheter Ablation    ACHILLES TENDON SURGERY  09/25/2018    Subcutaneous Tenotomy Of Achilles Tendon       FAMILY HISTORY  Family History   Problem Relation Name Age of Onset    Colonic polyp Father      Colon cancer Father      Coronary artery disease Sister      Colonic polyp Brother      Colonic polyp Paternal Grandmother      Colon cancer Paternal Grandmother         DOES/DOES NOT EC: does not have a family history of sleep disorder.      SOCIAL HISTORY  He  reports that he has never smoked. He has never used smokeless tobacco. He reports current alcohol use of about 5.0 standard drinks of alcohol per week. He reports that he does not currently use drugs. He currently lives alone.       PHYSICAL EXAM     VITAL SIGNS: /84 (BP Location: Right arm, Patient Position: Sitting, BP Cuff Size: Large adult)   Pulse 76   Temp 36.2 °C (97.1 °F) (Temporal)   Ht 1.88 m (6' 2\")   Wt (!) 180 kg (397 lb 12.8 oz)   BMI 51.07 kg/m²      PREVIOUS WEIGHTS:  Wt Readings from Last 3 Encounters:   01/10/24 (!) 180 kg (397 lb 12.8 oz)   12/09/23 (!) 186 kg (410 lb)   11/28/23 (!) 186 kg (411 lb)       Physical Exam  Physical Exam   Constitutional: Alert and oriented, cooperative, no obvious distress   HEENT: Non icteric or anemic, EOM WNL bilaterally     Upper Airway Examination:   Modified Mallampati Class: 2  OP Lateral wall narrowing ndgndrndanddndend:nd nd2nd Tonsil ndGndrndanddndend:nd nd2nd No high arched palate  Tongue Scalloping: Y  Retrognathia: N  Overjet: N    Neck: Supple, no JVD, no goiter, no adenopathy  Chest: CTA bilaterally, no wheezing, " crackles, rubs   Cardiac: RRR, S1 and S2, no murmur, rub, thrill   Abdomen: Obese, Soft, nontender, no masses, no organomegaly   Extremities: No clubbing, no LL edema   Neuromuscular: Cranial nerves grossly intact, no focal deficits        RESULTS/DATA     Bicarbonate (mmol/L)   Date Value   12/09/2023 24   08/24/2023 28   05/09/2023 25   04/25/2023 27       ASSESSMENT/PLAN     Mr. Connor is a 63 y.o. male and He was referred to the [facility] for evaluation of LACEY    Problem List and Orders  Problem List Items Addressed This Visit             ICD-10-CM    Obstructive sleep apnea - Primary G47.33     Discussed options for repeat testing  He would prefer HSAT as he can sleep in his recliner and on his stomach more easily.   Discussed tx options such as PAP, dental appliance, surgical and weight loss.  He plans to continue losing weight with diet modifications.  He is not interested in PAP machines. Discussed N30i as alternative mask option as he felt the FFM at the [facility] study in the past was too cumbersome  Discussed dental appliance. States he is most interested in this option  Discussed surgical options as well.   Plans for completing testing, follow up with Dr. Kauffman in March to review results and decide on plan of care          Relevant Orders    Home sleep apnea test (HSAT)

## 2024-01-10 NOTE — ASSESSMENT & PLAN NOTE
Discussed options for repeat testing  He would prefer HSAT as he can sleep in his recliner and on his stomach more easily.   Discussed tx options such as PAP, dental appliance, surgical and weight loss.  He plans to continue losing weight with diet modifications.  He is not interested in PAP machines. Discussed N30i as alternative mask option as he felt the FFM at the [facility] study in the past was too cumbersome  Discussed dental appliance. States he is most interested in this option  Discussed surgical options as well.   Plans for completing testing, follow up with Dr. Kauffman in March to review results and decide on plan of care

## 2024-01-10 NOTE — PATIENT INSTRUCTIONS
I will be off on leave of absence January through April. Please schedule follow up with one of my colleagues if need to be seen during this time frame.    Dr. Mathew Martin, [place] and [facility]  Dr. Norma Arriaga or Bing Fischer- [place] and Per Gallego CNP- [place]  Dr. Violet Martin and Kassy Tran PA-C, Savanna Martin, Lillian Magdaleno. NP, [place] and [facility] Sleep Medicine  DO [address]  [address] [id number]       NAME: Mitch Connor   DATE:  01/10/24    DIAGNOSIS:   1. Obstructive sleep apnea  Home sleep apnea test (HSAT)          Thank you for coming to the Sleep Medicine Clinic today! Your sleep medicine provider today was: JUMANA Garcia Below is a summary of your treatment plan, other important information, and our contact numbers:    TREATMENT PLAN:   - Follow-up in 2-3 months.  - If not already done, sign up for 'My Chart' and send prescription requests or messages through this      Scheduling a Sleep Study    Call the  Sleep Testing Center to speak with a sleep testing  to book your overnight sleep study procedure at one of our adult and pediatric-friendly sleep labs. Overnight sleep studies may be scheduled on a weekday or weekend.    We have child life services on a case by case basis at the [facility] location. We also perform daytime testing for shift workers on a case by case basis.    Locations for sleep studies are: [facility], [facility] ([facility]), [facility], [place], [facility], [place], [place].    Bring your usual medications and nightly routine items for your sleep study. In order to fall asleep faster in sleep lab, we advise patients to wake up earlier on the morning of the scheduled testing and avoid napping prior to testing. Sometimes, your provider may prescribe a sleep aid to be taken at lights out  in the sleep lab. If you are taking a sleep aid, please have somebody pick you up after the sleep testing.    Results of your sleep study will be given to the ordering clinician. Please contact their office for results or follow up as directed by your clinician.    For additional information about the sleep medicine services, please call [phone]       Instructions - Common LACEY Recs: - For your sleep apnea, continue to use your PAP every night and use it whenever you are sleeping.   - Avoid alcohol or sedatives several hours prior to sleeping.   - Get additional supplies for your PAP (e.g., mask, hose, filters) every 3 months or as your insurance allows from your [employer] company. Replacement cushions for your PAP mask can be requested monthly if airseals are an issue.  - Remember to clean your mask, tubings, and water chamber regularly as instructed.  - Avoid driving or operating heavy machinery when drowsy. A person driving while sleepy is five (5) times more likely to have an accident. If you feel sleepy, pull over and take a short power nap (sleep for less than 30 minutes). Otherwise, ask somebody to drive you.    EASY WAYS TO IMPROVE YOUR SLEEP:  1. Go to bed and wake up at the same time every day.   Aim for 8 hours but some people need less, some need more.   Get out of bed if you are not sleeping.   Limit naps to 20 min or less.   2. Expose yourself to daylight and/ or bright light in the morning.   Go outside or spend time near a window each morning.   You can use a light box (found on [employer]) if you wake before the sunrise.   Limit light exposure in the evenings (including electronic usage).   Try meditation, reading, stretching, deep breathing, warm shower or bath, or yoga nidra as part of your bedtime routine. There are many great FREE, videos or audio tracks on YouTube/ "[employer] etc for guidance.  3. Exercise, in some form, EVERY day, but not too close to bedtime. Consider making this part of your routine  at the start of your day, followed by a cool shower.  4. Eat meals at roughly the same time every day. Make sure you are prioritizing fruits, vegetables, whole grains, lean proteins.  5. Time your caffeine intake. Make sure you are not drinking caffeine within 8 to 12 hours prior to your bedtime.   6. Avoid marijuana, alcohol, and nicotine. They will reduce sleep quality in any quantity.  7. Learn to manage anxiety. Psychology services at  can be reached at [phone] to schedule an appointment.     IMPORTANT INFORMATION:     Call 911 for medical emergencies.  Our offices are generally open from Monday-Friday, 9 am - 5 pm.  If you need to get in touch with me, you may either call me and my team(number is below) or you can use [facility].  If a referral for a test, for CPAP, or for another specialist was made, and you have not heard about scheduling this within a week, please call scheduling at [phone]).  If you are unable to make your appointment for clinic or an overnight study, kindly call the office at least 48 hours in advance to cancel and reschedule.  If you are on CPAP, please bring your device's card to each clinic appointment unless told otherwise by your provider.  There are no supporting services by either the sleep doctors or their staff on weekends and Holidays, or after 5 PM on weekdays.   If you have been asked to come to a sleep study, make sure you bring toiletries, a comfy pillow, and any nighttime medications that you may regularly take. Also be sure to eat dinner before you arrive. We generally do not provide meals.      PRESCRIPTIONS:  We require 7 days advanced notice for prescription refills. If we do not receive the request in this time, we cannot guarantee that your medication will be refilled in time. Please contact the sleep nurses listed below for refills or request via [facility].     IMPORTANT PHONE NUMBERS:   Sleep Medicine Clinic Fax: [phone]  Appointments (for Pediatric  Sleep Clinic): [phone] - option 1  Appointments (for Adult Sleep Clinic): [phone] - option 2  Appointments (For Sleep Studies): [phone] - option 3  Behavioral Sleep Medicine: [phone]  Sleep Surgery: [phone]  ENT (Otolaryngology): [phone]  Headache Clinic (Neurology): [phone]  Neurology: [phone]  Psychiatry: [phone]  Pulmonary Function Testing (PFT) Center: [phone]  Pulmonary Medicine: [phone]  [employer] (DME): [phone]  [place] (DME): [phone]  [facility] (DME): [phone]    Our Adult Sleep Medicine Team (Please do not hesitate to call the office or sleep nurse with any questions between appointments):    Adult Sleep Nurses (Hodan Rush, RN and Jenn Bourgeois RN):  For clinical questions and refilling prescriptions: [phone]  Email sleep diaries and other documents at: [email or web address]    Adult Sleep Medicine Secretaries:  Danielle Peck (For Mike/Lindsay/Krise/Strohl/Yeh/Gallego):   P: [phone]  F: [phone]  Adrienne Sandy (For Kauffman/Guggenbiller): P: [phone]  Fax: [phone]  Elisha Wright (For Jurcevic/Blank): P: [phone]  F: [phone]  Emeli Dominguez (For [place]): P: [phone]  F: [phone]  Griselda Stephen (For Chantal/Rasta/Zakhly): P: [phone]  F: [phone]  Duyen Sanchez (For Arriaga/Fischer): P: [phone]  F: [phone]     Adult Sleep Medicine Advanced Practice Providers:  Shlomo Cervantes ([place], [place])  Madison Magdaleno ([facility])  Akila Willson CNP ([place], [place], [facility])  Jovita Tran CNP ([place], [place], [facility])  Jing Peter ([facility], [facility])  Bing Fischer CNP ([facility])      Our Sleep Testing Center (STC) Locations:  Our team will contact you to schedule your sleep study, however, you can contact us as follow:  Main Phone Line (scheduling only): [phone]  [phone]), option 3  Adult and Pediatric Locations   [place] (6 years and older): [facility] - 4th floor ([address] [facility]) After hours line: [phone]  [facility] (Main campus: All ages): [facility], 6th floor. After hours line: [phone]   [place] (5 years and older; younger considered on case-by-case basis): [address]; [address], [address]. Scheduling  After hours line: [phone]   [place] (6 years and older): [address]; [address]; [address]   [place] (6 years and older): [address] [facility], [address]  After hours line: [phone]   [place] (13 years and older) in [place]: [address], 2nd floor  After hours line: [phone]   [place] (13 year and older): [address]  After hours line: [phone] (Home studies out of [facility])    Adult Only Locations:   [place] (18 years and older): [address], 2nd floor   [place] (18 years and older): [address] [facility] [facility]; 4th floor  After hours line: [phone]   [place] (18 years and older) at [place]: [address] [facility]  After hours line: [phone]        CONTACTING YOUR SLEEP MEDICINE PROVIDER:  Send a message directly to your provider through \"My Chart\", which is the email service through your  Records Account: [email or web address]   Call [phone] and leave a message. One of the administrative assistants will forward the message to your sleep medicine provider through \"My Chart\" and/or email.     Your sleep medicine provider for this visit was: JEFERSON Garcia-CNP    In the event that you are running more than 15 minutes late to your appointment, I will kindly ask you to reschedule.       "

## 2024-01-18 ENCOUNTER — HOSPITAL ENCOUNTER (OUTPATIENT)
Facility: HOSPITAL | Age: 64
Setting detail: OUTPATIENT SURGERY
Discharge: HOME | End: 2024-01-18
Attending: UROLOGY | Admitting: UROLOGY
Payer: COMMERCIAL

## 2024-01-18 ENCOUNTER — APPOINTMENT (OUTPATIENT)
Dept: RADIOLOGY | Facility: HOSPITAL | Age: 64
End: 2024-01-18
Payer: COMMERCIAL

## 2024-01-18 ENCOUNTER — PHARMACY VISIT (OUTPATIENT)
Dept: PHARMACY | Facility: CLINIC | Age: 64
End: 2024-01-18
Payer: COMMERCIAL

## 2024-01-18 VITALS
BODY MASS INDEX: 40.43 KG/M2 | OXYGEN SATURATION: 95 % | HEIGHT: 74 IN | WEIGHT: 315 LBS | SYSTOLIC BLOOD PRESSURE: 134 MMHG | TEMPERATURE: 97.5 F | HEART RATE: 69 BPM | DIASTOLIC BLOOD PRESSURE: 74 MMHG | RESPIRATION RATE: 16 BRPM

## 2024-01-18 DIAGNOSIS — N20.0 CALCULUS OF KIDNEY: Primary | ICD-10-CM

## 2024-01-18 LAB — GLUCOSE BLD MANUAL STRIP-MCNC: 163 MG/DL (ref 74–99)

## 2024-01-18 PROCEDURE — 2720000007 HC OR 272 NO HCPCS: Performed by: UROLOGY

## 2024-01-18 PROCEDURE — 7100000010 HC PHASE TWO TIME - EACH INCREMENTAL 1 MINUTE: Performed by: UROLOGY

## 2024-01-18 PROCEDURE — 2500000004 HC RX 250 GENERAL PHARMACY W/ HCPCS (ALT 636 FOR OP/ED): Performed by: ANESTHESIOLOGY

## 2024-01-18 PROCEDURE — 2550000001 HC RX 255 CONTRASTS: Performed by: UROLOGY

## 2024-01-18 PROCEDURE — 2500000005 HC RX 250 GENERAL PHARMACY W/O HCPCS: Performed by: ANESTHESIOLOGIST ASSISTANT

## 2024-01-18 PROCEDURE — 2500000004 HC RX 250 GENERAL PHARMACY W/ HCPCS (ALT 636 FOR OP/ED): Performed by: ANESTHESIOLOGIST ASSISTANT

## 2024-01-18 PROCEDURE — 7100000009 HC PHASE TWO TIME - INITIAL BASE CHARGE: Performed by: UROLOGY

## 2024-01-18 PROCEDURE — 7100000001 HC RECOVERY ROOM TIME - INITIAL BASE CHARGE: Performed by: UROLOGY

## 2024-01-18 PROCEDURE — C1769 GUIDE WIRE: HCPCS | Performed by: UROLOGY

## 2024-01-18 PROCEDURE — C2617 STENT, NON-COR, TEM W/O DEL: HCPCS | Performed by: UROLOGY

## 2024-01-18 PROCEDURE — 82947 ASSAY GLUCOSE BLOOD QUANT: CPT

## 2024-01-18 PROCEDURE — C1894 INTRO/SHEATH, NON-LASER: HCPCS | Performed by: UROLOGY

## 2024-01-18 PROCEDURE — RXMED WILLOW AMBULATORY MEDICATION CHARGE

## 2024-01-18 PROCEDURE — 3600000003 HC OR TIME - INITIAL BASE CHARGE - PROCEDURE LEVEL THREE: Performed by: UROLOGY

## 2024-01-18 PROCEDURE — 2780000003 HC OR 278 NO HCPCS: Performed by: UROLOGY

## 2024-01-18 PROCEDURE — 3700000002 HC GENERAL ANESTHESIA TIME - EACH INCREMENTAL 1 MINUTE: Performed by: UROLOGY

## 2024-01-18 PROCEDURE — 2500000004 HC RX 250 GENERAL PHARMACY W/ HCPCS (ALT 636 FOR OP/ED): Performed by: UROLOGY

## 2024-01-18 PROCEDURE — 3600000008 HC OR TIME - EACH INCREMENTAL 1 MINUTE - PROCEDURE LEVEL THREE: Performed by: UROLOGY

## 2024-01-18 PROCEDURE — 93010 ELECTROCARDIOGRAM REPORT: CPT | Performed by: INTERNAL MEDICINE

## 2024-01-18 PROCEDURE — 2500000001 HC RX 250 WO HCPCS SELF ADMINISTERED DRUGS (ALT 637 FOR MEDICARE OP): Performed by: ANESTHESIOLOGY

## 2024-01-18 PROCEDURE — 76000 FLUOROSCOPY <1 HR PHYS/QHP: CPT

## 2024-01-18 PROCEDURE — 7100000002 HC RECOVERY ROOM TIME - EACH INCREMENTAL 1 MINUTE: Performed by: UROLOGY

## 2024-01-18 PROCEDURE — 3700000001 HC GENERAL ANESTHESIA TIME - INITIAL BASE CHARGE: Performed by: UROLOGY

## 2024-01-18 DEVICE — INLAY OPTIMA URETERAL STENT W/O GUIDEWIRE
Type: IMPLANTABLE DEVICE | Site: URETER | Status: FUNCTIONAL
Brand: BARD® INLAY OPTIMA® URETERAL STENT

## 2024-01-18 RX ORDER — FENTANYL CITRATE 50 UG/ML
INJECTION, SOLUTION INTRAMUSCULAR; INTRAVENOUS AS NEEDED
Status: DISCONTINUED | OUTPATIENT
Start: 2024-01-18 | End: 2024-01-18

## 2024-01-18 RX ORDER — OXYCODONE AND ACETAMINOPHEN 7.5; 325 MG/1; MG/1
1 TABLET ORAL EVERY 6 HOURS PRN
Qty: 20 TABLET | Refills: 0 | Status: SHIPPED | OUTPATIENT
Start: 2024-01-18 | End: 2024-02-13 | Stop reason: ALTCHOICE

## 2024-01-18 RX ORDER — KETOROLAC TROMETHAMINE 30 MG/ML
INJECTION, SOLUTION INTRAMUSCULAR; INTRAVENOUS AS NEEDED
Status: DISCONTINUED | OUTPATIENT
Start: 2024-01-18 | End: 2024-01-18

## 2024-01-18 RX ORDER — SODIUM CHLORIDE, SODIUM LACTATE, POTASSIUM CHLORIDE, CALCIUM CHLORIDE 600; 310; 30; 20 MG/100ML; MG/100ML; MG/100ML; MG/100ML
100 INJECTION, SOLUTION INTRAVENOUS CONTINUOUS
Status: DISCONTINUED | OUTPATIENT
Start: 2024-01-18 | End: 2024-01-18 | Stop reason: SDUPTHER

## 2024-01-18 RX ORDER — HYDRALAZINE HYDROCHLORIDE 20 MG/ML
5 INJECTION INTRAMUSCULAR; INTRAVENOUS EVERY 30 MIN PRN
Status: DISCONTINUED | OUTPATIENT
Start: 2024-01-18 | End: 2024-01-18 | Stop reason: HOSPADM

## 2024-01-18 RX ORDER — PHENYLEPHRINE HCL IN 0.9% NACL 0.4MG/10ML
SYRINGE (ML) INTRAVENOUS AS NEEDED
Status: DISCONTINUED | OUTPATIENT
Start: 2024-01-18 | End: 2024-01-18

## 2024-01-18 RX ORDER — METOCLOPRAMIDE 10 MG/1
10 TABLET ORAL ONCE
Status: COMPLETED | OUTPATIENT
Start: 2024-01-18 | End: 2024-01-18

## 2024-01-18 RX ORDER — FAMOTIDINE 20 MG/1
20 TABLET, FILM COATED ORAL ONCE
Status: COMPLETED | OUTPATIENT
Start: 2024-01-18 | End: 2024-01-18

## 2024-01-18 RX ORDER — IPRATROPIUM BROMIDE 0.5 MG/2.5ML
500 SOLUTION RESPIRATORY (INHALATION) ONCE
Status: DISCONTINUED | OUTPATIENT
Start: 2024-01-18 | End: 2024-01-18 | Stop reason: HOSPADM

## 2024-01-18 RX ORDER — PROPOFOL 10 MG/ML
INJECTION, EMULSION INTRAVENOUS AS NEEDED
Status: DISCONTINUED | OUTPATIENT
Start: 2024-01-18 | End: 2024-01-18

## 2024-01-18 RX ORDER — SODIUM CHLORIDE, SODIUM LACTATE, POTASSIUM CHLORIDE, CALCIUM CHLORIDE 600; 310; 30; 20 MG/100ML; MG/100ML; MG/100ML; MG/100ML
100 INJECTION, SOLUTION INTRAVENOUS CONTINUOUS
Status: DISCONTINUED | OUTPATIENT
Start: 2024-01-18 | End: 2024-01-18 | Stop reason: HOSPADM

## 2024-01-18 RX ORDER — ONDANSETRON HYDROCHLORIDE 2 MG/ML
4 INJECTION, SOLUTION INTRAVENOUS ONCE AS NEEDED
Status: DISCONTINUED | OUTPATIENT
Start: 2024-01-18 | End: 2024-01-18 | Stop reason: HOSPADM

## 2024-01-18 RX ORDER — MIDAZOLAM HYDROCHLORIDE 1 MG/ML
INJECTION, SOLUTION INTRAMUSCULAR; INTRAVENOUS AS NEEDED
Status: DISCONTINUED | OUTPATIENT
Start: 2024-01-18 | End: 2024-01-18

## 2024-01-18 RX ORDER — DIPHENHYDRAMINE HYDROCHLORIDE 50 MG/ML
12.5 INJECTION INTRAMUSCULAR; INTRAVENOUS ONCE AS NEEDED
Status: DISCONTINUED | OUTPATIENT
Start: 2024-01-18 | End: 2024-01-18 | Stop reason: HOSPADM

## 2024-01-18 RX ORDER — ONDANSETRON HYDROCHLORIDE 2 MG/ML
INJECTION, SOLUTION INTRAVENOUS AS NEEDED
Status: DISCONTINUED | OUTPATIENT
Start: 2024-01-18 | End: 2024-01-18

## 2024-01-18 RX ORDER — OXYCODONE HYDROCHLORIDE 5 MG/1
5 TABLET ORAL EVERY 4 HOURS PRN
Status: DISCONTINUED | OUTPATIENT
Start: 2024-01-18 | End: 2024-01-18 | Stop reason: HOSPADM

## 2024-01-18 RX ORDER — CEFAZOLIN SODIUM IN 0.9 % NACL 3 G/100 ML
INTRAVENOUS SOLUTION, PIGGYBACK (ML) INTRAVENOUS
Status: DISCONTINUED
Start: 2024-01-18 | End: 2024-01-18 | Stop reason: HOSPADM

## 2024-01-18 RX ORDER — ROCURONIUM BROMIDE 10 MG/ML
INJECTION, SOLUTION INTRAVENOUS AS NEEDED
Status: DISCONTINUED | OUTPATIENT
Start: 2024-01-18 | End: 2024-01-18

## 2024-01-18 RX ORDER — ALBUTEROL SULFATE 0.83 MG/ML
2.5 SOLUTION RESPIRATORY (INHALATION) ONCE AS NEEDED
Status: DISCONTINUED | OUTPATIENT
Start: 2024-01-18 | End: 2024-01-18 | Stop reason: HOSPADM

## 2024-01-18 RX ORDER — SUCCINYLCHOLINE CHLORIDE 20 MG/ML
INJECTION INTRAMUSCULAR; INTRAVENOUS AS NEEDED
Status: DISCONTINUED | OUTPATIENT
Start: 2024-01-18 | End: 2024-01-18

## 2024-01-18 RX ORDER — CEFAZOLIN SODIUM IN 0.9 % NACL 3 G/100 ML
3 INTRAVENOUS SOLUTION, PIGGYBACK (ML) INTRAVENOUS ONCE
Status: COMPLETED | OUTPATIENT
Start: 2024-01-18 | End: 2024-01-18

## 2024-01-18 RX ADMIN — SUCCINYLCHOLINE CHLORIDE 120 MG: 20 INJECTION, SOLUTION INTRAMUSCULAR; INTRAVENOUS at 13:30

## 2024-01-18 RX ADMIN — FAMOTIDINE 20 MG: 20 TABLET, FILM COATED ORAL at 12:57

## 2024-01-18 RX ADMIN — Medication 100 MCG: at 13:50

## 2024-01-18 RX ADMIN — ROCURONIUM BROMIDE 20 MG: 10 INJECTION, SOLUTION INTRAVENOUS at 14:01

## 2024-01-18 RX ADMIN — OXYCODONE HYDROCHLORIDE 5 MG: 5 TABLET ORAL at 16:47

## 2024-01-18 RX ADMIN — ONDANSETRON HYDROCHLORIDE 4 MG: 2 INJECTION INTRAMUSCULAR; INTRAVENOUS at 14:34

## 2024-01-18 RX ADMIN — SUGAMMADEX 200 MG: 100 INJECTION, SOLUTION INTRAVENOUS at 14:42

## 2024-01-18 RX ADMIN — Medication 3 G: at 13:22

## 2024-01-18 RX ADMIN — Medication 100 MCG: at 13:58

## 2024-01-18 RX ADMIN — METOCLOPRAMIDE 10 MG: 10 TABLET ORAL at 12:57

## 2024-01-18 RX ADMIN — Medication 100 MCG: at 13:43

## 2024-01-18 RX ADMIN — MIDAZOLAM 2 MG: 1 INJECTION INTRAMUSCULAR; INTRAVENOUS at 13:23

## 2024-01-18 RX ADMIN — ROCURONIUM BROMIDE 20 MG: 10 INJECTION, SOLUTION INTRAVENOUS at 13:34

## 2024-01-18 RX ADMIN — KETOROLAC TROMETHAMINE 30 MG: 30 INJECTION, SOLUTION INTRAMUSCULAR at 14:34

## 2024-01-18 RX ADMIN — SODIUM CHLORIDE, SODIUM LACTATE, POTASSIUM CHLORIDE, AND CALCIUM CHLORIDE 100 ML/HR: 600; 310; 30; 20 INJECTION, SOLUTION INTRAVENOUS at 12:57

## 2024-01-18 RX ADMIN — FENTANYL CITRATE 100 MCG: 50 INJECTION, SOLUTION INTRAMUSCULAR; INTRAVENOUS at 13:28

## 2024-01-18 RX ADMIN — PROPOFOL 200 MG: 10 INJECTION, EMULSION INTRAVENOUS at 13:28

## 2024-01-18 RX ADMIN — Medication 100 MCG: at 13:40

## 2024-01-18 SDOH — HEALTH STABILITY: MENTAL HEALTH: CURRENT SMOKER: 0

## 2024-01-18 ASSESSMENT — PAIN - FUNCTIONAL ASSESSMENT
PAIN_FUNCTIONAL_ASSESSMENT: 0-10

## 2024-01-18 ASSESSMENT — PAIN SCALES - GENERAL
PAINLEVEL_OUTOF10: 2
PAINLEVEL_OUTOF10: 2
PAINLEVEL_OUTOF10: 1
PAINLEVEL_OUTOF10: 5 - MODERATE PAIN
PAINLEVEL_OUTOF10: 1
PAINLEVEL_OUTOF10: 0 - NO PAIN
PAINLEVEL_OUTOF10: 2
PAIN_LEVEL: 2
PAINLEVEL_OUTOF10: 1
PAINLEVEL_OUTOF10: 3
PAINLEVEL_OUTOF10: 1

## 2024-01-18 ASSESSMENT — ENCOUNTER SYMPTOMS
CHEST TIGHTNESS: 0
CONSTIPATION: 1
PALPITATIONS: 0
DIZZINESS: 0
ABDOMINAL PAIN: 0
DIARRHEA: 0
NECK PAIN: 0
BACK PAIN: 0
PSYCHIATRIC NEGATIVE: 1
HEADACHES: 0
FLANK PAIN: 1
ARTHRALGIAS: 1
SHORTNESS OF BREATH: 1
COUGH: 0

## 2024-01-18 ASSESSMENT — COLUMBIA-SUICIDE SEVERITY RATING SCALE - C-SSRS
2. HAVE YOU ACTUALLY HAD ANY THOUGHTS OF KILLING YOURSELF?: NO
1. IN THE PAST MONTH, HAVE YOU WISHED YOU WERE DEAD OR WISHED YOU COULD GO TO SLEEP AND NOT WAKE UP?: NO
6. HAVE YOU EVER DONE ANYTHING, STARTED TO DO ANYTHING, OR PREPARED TO DO ANYTHING TO END YOUR LIFE?: NO

## 2024-01-18 ASSESSMENT — PAIN DESCRIPTION - ORIENTATION: ORIENTATION: RIGHT

## 2024-01-18 NOTE — POST-PROCEDURE NOTE
Discharge instructions reviewed with pt and brother, verbalize understanding. Home going lindsay catheter care instructions given with handout, pt will call office in morning for removal. Pt tolerating po cookies and gingerale well

## 2024-01-18 NOTE — ANESTHESIA PREPROCEDURE EVALUATION
Patient: Mitch Connor    Procedure Information       Date/Time: 01/18/24 1300    Procedure: Cystoscopy with Ureteroscopy Lithotripsy Laser (holmium) (Right)    Location: [facility] OR 10 / Virtual [facility] OR    Surgeons: Ike Morgan MD            Relevant Problems   Anesthesia (within normal limits)      Cardiovascular   (+) Atrial flutter (CMS/HCC)   (+) Chronic atrial fibrillation (CMS/HCC)   (+) Essential hypertension   (+) Permanent atrial fibrillation (CMS/HCC)      Endocrine   (+) Class 3 severe obesity with serious comorbidity and body mass index (BMI) of 50.0 to 59.9 in adult (CMS/HCC)   (+) Morbid obesity (CMS/HCC)      GI (within normal limits)      /Renal   (+) Calculus of kidney   (+) Staghorn renal calculus      Neuro/Psych (within normal limits)      Pulmonary   (+) Obstructive sleep apnea      GI/Hepatic (within normal limits)      Hematology   (+) Long term (current) use of anticoagulants      Musculoskeletal   (+) Primary osteoarthritis of left hip      Eyes, Ears, Nose, and Throat (within normal limits)      Infectious Disease (within normal limits)       Clinical information reviewed:    Allergies  Meds             Allergies   Allergen Reactions    Metformin Myalgia     .hmes  Past Medical History:   Diagnosis Date    Solitary pulmonary nodule     Lung nodule     Past Surgical History:   Procedure Laterality Date    ABLATION OF DYSRHYTHMIC FOCUS  09/25/2018    Catheter Ablation    ACHILLES TENDON SURGERY  09/25/2018    Subcutaneous Tenotomy Of Achilles Tendon       NPO Detail:  NPO/Void Status  Carbohydrate Drink Given Prior to Surgery? : N  Date of Last Liquid: 01/18/24  Time of Last Liquid: 0200  Date of Last Solid: 01/17/24  Time of Last Solid: 2300  Last Intake Type: Clear fluids  Time of Last Void: 1100         Physical Exam    Airway  Mallampati: II  TM distance: >3 FB  Neck ROM: full     Cardiovascular - normal exam     Dental - normal exam     Pulmonary - normal exam     Abdominal   (+)  obese             Anesthesia Plan    History of general anesthesia?: yes  History of complications of general anesthesia?: no    ASA 3     general   (GA ETT)  The patient is not a current smoker.  Patient was not previously instructed to abstain from smoking on day of procedure.  Patient did not smoke on day of procedure.  Education provided regarding risk of obstructive sleep apnea.  intravenous induction   Anesthetic plan and risks discussed with patient.    Plan discussed with CAA.

## 2024-01-18 NOTE — ANESTHESIA POSTPROCEDURE EVALUATION
Patient: Mitch Connor    Procedure Summary       Date: 01/18/24 Room / Location: [facility] OR 10 / [facility]    Anesthesia Start: 1319 Anesthesia Stop: 1455    Procedure: Cystoscopy with Ureteroscopy Lithotripsy Laser (holmium) (Right) Diagnosis:       Kidney stone      (RIGHT KIDNEY STONE N20.0)    Surgeons: Ike Morgan MD Responsible Provider: Dillan Burgos MD    Anesthesia Type: general ASA Status: 3            Anesthesia Type: general    Vitals Value Taken Time   /85 01/18/24 1455   Temp 36.5 01/18/24 1455   Pulse 74 01/18/24 1455   Resp 18 01/18/24 1455   SpO2 95 01/18/24 1455       Anesthesia Post Evaluation    Patient location during evaluation: PACU  Patient participation: complete - patient participated  Level of consciousness: awake and alert  Pain score: 2  Pain management: adequate  Airway patency: patent  Cardiovascular status: acceptable  Respiratory status: acceptable  Hydration status: acceptable  Postoperative Nausea and Vomiting: none        No notable events documented.

## 2024-01-18 NOTE — OP NOTE
Cystoscopy with Ureteroscopy Lithotripsy Laser (holmium) (R), Cystoscopy with Insertion Stent Ureter Operative Note     Date: 2024  OR Location: [facility]    Name: Mitch Connor, : 1960, Age: 63 y.o., MRN: [id number], Sex: male    Diagnosis  Pre-op Diagnosis     * Kidney stone [N20.0] Post-op Diagnosis     * Kidney stone [N20.0]     Procedures  Cystoscopy with Ureteroscopy Lithotripsy Laser (holmium)  34650 - FL CYSTO W/URETEROSCOPY W/LITHOTRIPSY    Cystoscopy with Insertion Stent Ureter  91494 - FL CYSTO W/INSERT URETERAL STENT      Surgeons      * Ike Morgan - Primary    Resident/Fellow/Other Assistant:  Surgeon(s) and Role:    Procedure Summary  Anesthesia: General  ASA: III  Anesthesia Staff: Anesthesiologist: Dillan Burgos MD  C-AA: CLAYTON Liu  Estimated Blood Loss: 30 mL  Intra-op Medications:   Medication Name Total Dose   ceFAZolin in 0.9% sod chloride (Ancef) IVPB 3 g 3 g              Anesthesia Record               Intraprocedure I/O Totals          Intake    lactated Ringer's infusion 1500.00 mL    Total Intake 1500 mL          Specimen: No specimens collected     Staff:   Circulator: Shanta Ambriz RN; Rebecca Hsu RN  Scrub Person: Radha Cardenas; Ronna English         Drains and/or Catheters:   Urethral Catheter 22 Fr. (Active)       Tourniquet Times:         Implants:  Implants       Type Name Action Serial No.      Stent STENT, INLAY OPTIMA, 6FR X 28CM - [id number] Implanted               Findings: Several stones within the right kidney    Indications: Mitch Connor is an 63 y.o. male who is having surgery for RIGHT KIDNEY STONE N20.0.     The patient was seen in the preoperative area. The risks, benefits, complications, treatment options, non-operative alternatives, expected recovery and outcomes were discussed with the patient. The possibilities of reaction to medication, pulmonary aspiration, injury to surrounding structures, bleeding, recurrent infection, the  need for additional procedures, failure to diagnose a condition, and creating a complication requiring transfusion or operation were discussed with the patient. The patient concurred with the proposed plan, giving informed consent.  The site of surgery was properly noted/marked if necessary per policy. The patient has been actively warmed in preoperative area. Preoperative antibiotics have been ordered and given within 1 hours of incision. Venous thrombosis prophylaxis are not indicated.    Procedure Details: Patient was taken to the operating room placed under general anesthesia.  He was then placed in the dorsolithotomy position and prepped and draped in a sterile manner.  Patient underwent cystoscopy which showed an occlusive prostate with enlarged lateral lobes.  Inspection of the bladder revealed no obvious abnormalities.  The right ureteral orifice was identified and through an open-ended flexible tip catheter a guidewire was placed up the ureter and into the kidney.  The flexible tip catheter was removed.  With the help of a second guidewire the rigid ureteroscope was passed into the ureter and passed up the distal ureter.  There was a sharp change in direction of the ureter and I was unable to easily manipulate the rigid ureteroscope up the ureter so at that point I stopped with the rigid ureteroscope and The second guidewire again.  Over the second guidewire a ureteral access sheath was placed and this was advanced up to the level of the kidney.  The flexible ureteroscope was then passed.  The stone that had been at the ureteropelvic junction had been dislodged and now appeared to be in a midpole ureter.  The stone was quite large and it was approached with the holmium laser and fragmented into multiple small fragments.  There are other stones in the lower pole calyx that were also identified and fragmented.  As best as possible this large stone burden was well fragmented.  Ultimately there was some  bleeding and visualization was decreased but I did feel that there was excellent fragmentation of the stones.  Because of the large stone burden, however, I did feel that it was best to place a double-J stent.  The guidewire was backloaded through the cystoscope and a 6 Danish 28 cm double-J stent was placed with a good curl in the kidney and a good coil in the bladder.  The bladder was drained but because it was bloody I did place a Gant catheter.  This was connected to dependent drainage.  The patient was then awakened and taken to the recovery room in stable condition.  Complications:  None; patient tolerated the procedure well.    Disposition: PACU - hemodynamically stable.  Condition: stable         Additional Details:       Attending Attestation: I performed the procedure.    Ike Morgan  Phone Number: [phone]

## 2024-01-18 NOTE — POST-PROCEDURE NOTE
Pt to side of bed to get dressed, steady on his feet. Reports decreased pain after po oxy now 2/10. Gant catheter leg bag in place, preparing for discharge

## 2024-01-18 NOTE — H&P
"History Of Present Illness  Mitch Connor is an obese 63 y.o. male presenting with calculus of the right kidney. Pain \"on and off\" for \"quite a while\". Hematuria \"about every other week\". History of stones that he was able to pass, this is his first surgical procedure for nephrolithiasis.      Past Medical History  Past Medical History:   Diagnosis Date    BPH (benign prostatic hyperplasia)     Diabetes mellitus (CMS/HCC)     DVT (deep venous thrombosis) (CMS/HCC)     Hypertension     Sleep apnea     Solitary pulmonary nodule     Lung nodule       Surgical History  Past Surgical History:   Procedure Laterality Date    ABLATION OF DYSRHYTHMIC FOCUS  09/25/2018    Catheter Ablation    ACHILLES TENDON SURGERY  09/25/2018    Subcutaneous Tenotomy Of Achilles Tendon        Social History  He reports that he has never smoked. He has never used smokeless tobacco. He reports current alcohol use of about 5.0 standard drinks of alcohol per week. He reports that he does not currently use drugs.    Family History  Family History   Problem Relation Name Age of Onset    Colonic polyp Father      Colon cancer Father      Coronary artery disease Sister      Colonic polyp Brother      Colonic polyp Paternal Grandmother      Colon cancer Paternal Grandmother          Allergies  Metformin    Review of Systems   Respiratory:  Positive for shortness of breath. Negative for cough and chest tightness.         Apnea on exertion.   Cardiovascular:  Positive for leg swelling. Negative for chest pain and palpitations.        Bilateral lower leg edema.   Gastrointestinal:  Positive for constipation. Negative for abdominal pain and diarrhea.        \"On and off\", uses laxative prn.   Genitourinary:  Positive for flank pain.   Musculoskeletal:  Positive for arthralgias. Negative for back pain and neck pain.        Pain in left hip and right knee.   Skin: Negative.    Neurological:  Negative for dizziness and headaches.   Psychiatric/Behavioral: " "Negative.          Physical Exam  Constitutional:       Appearance: He is obese.   HENT:      Head: Normocephalic and atraumatic.   Cardiovascular:      Rate and Rhythm: Normal rate and regular rhythm.      Heart sounds: Normal heart sounds. No murmur heard.  Pulmonary:      Breath sounds: Normal breath sounds. No wheezing or rhonchi.   Abdominal:      General: Bowel sounds are normal.      Palpations: Abdomen is soft.      Tenderness: There is no abdominal tenderness.   Musculoskeletal:         General: No deformity or signs of injury.      Cervical back: No rigidity.   Skin:     General: Skin is warm and dry.   Neurological:      Mental Status: He is alert and oriented to person, place, and time.   Psychiatric:         Mood and Affect: Mood normal.         Behavior: Behavior normal.          Last Recorded Vitals  Blood pressure 147/85, pulse 82, temperature 36.8 °C (98.2 °F), temperature source Tympanic, resp. rate 18, height 1.88 m (6' 2\"), weight (!) 180 kg (397 lb), SpO2 98 %.    Relevant Results             Assessment/Plan   Active Problems:    Calculus of kidney      Patient reports to the OR today for a cystoscopy with ureteroscopy and laser lithotripsy.       I spent 20 minutes in the professional and overall care of this patient.      Mathew Castañeda PA-C    "

## 2024-01-18 NOTE — ANESTHESIA PROCEDURE NOTES
Airway  Date/Time: 1/18/2024 1:28 PM  Urgency: elective    Airway not difficult    Staffing  Performed: CLAYTON   Authorized by: Dillan Burgos MD    Performed by: CLAYTON Liu  Patient location during procedure: OR    Indications and Patient Condition  Indications for airway management: anesthesia  Spontaneous Ventilation: absent  Preoxygenated: yes  Patient position: sniffing  Mask difficulty assessment: 0 - not attempted    Final Airway Details  Final airway type: endotracheal airway      Successful airway: ETT  Cuffed: yes   Successful intubation technique: video laryngoscopy  Blade: Alida  Blade size: #4  ETT size (mm): 8.0  Cormack-Lehane Classification: grade I - full view of glottis  Placement verified by: capnometry   Measured from: lips  ETT to lips (cm): 23  Number of attempts at approach: 1

## 2024-01-18 NOTE — POST-PROCEDURE NOTE
Pt received from pacu, awake, a/o x3, denies any discomforts. Gant catheter draining jessica red blood, per pacu, irrigates easily. Per md order, will give pt leg bag to be discharge home. Gave pt hardik, brother called to bedside

## 2024-01-18 NOTE — DISCHARGE INSTRUCTIONS
Expect some blood in the urine for several days.  Okay to restart Xarelto in 3 days  Expect some right flank pain.

## 2024-01-19 ENCOUNTER — HOSPITAL ENCOUNTER (OUTPATIENT)
Dept: CARDIOLOGY | Facility: HOSPITAL | Age: 64
Discharge: HOME | End: 2024-01-19
Payer: COMMERCIAL

## 2024-01-19 PROCEDURE — 93005 ELECTROCARDIOGRAM TRACING: CPT

## 2024-01-23 ENCOUNTER — TELEPHONE (OUTPATIENT)
Dept: SLEEP MEDICINE | Facility: CLINIC | Age: 64
End: 2024-01-23
Payer: COMMERCIAL

## 2024-01-23 NOTE — TELEPHONE ENCOUNTER
----- Message from Italo Paiz sent at 1/23/2024 11:18 AM EST -----  Regarding: SLEEP  Good Morning,     Our office have made over 3 unsuccessful attempts to reach the patient to schedule a sleep study. If you will like us to move forward with scheduling this patient, please have the patient reach out to Sleep [facility] at [phone]!  Thank you

## 2024-01-24 LAB
ATRIAL RATE: 70 BPM
P AXIS: 86 DEGREES
P OFFSET: 198 MS
P ONSET: 148 MS
PR INTERVAL: 146 MS
Q ONSET: 221 MS
QRS COUNT: 11 BEATS
QRS DURATION: 88 MS
QT INTERVAL: 422 MS
QTC CALCULATION(BAZETT): 455 MS
QTC FREDERICIA: 444 MS
R AXIS: 11 DEGREES
T AXIS: 69 DEGREES
T OFFSET: 432 MS
VENTRICULAR RATE: 70 BPM

## 2024-02-13 ENCOUNTER — OFFICE VISIT (OUTPATIENT)
Dept: CARDIOLOGY | Facility: HOSPITAL | Age: 64
End: 2024-02-13
Payer: COMMERCIAL

## 2024-02-13 VITALS
SYSTOLIC BLOOD PRESSURE: 127 MMHG | BODY MASS INDEX: 40.43 KG/M2 | OXYGEN SATURATION: 96 % | HEART RATE: 70 BPM | WEIGHT: 315 LBS | HEIGHT: 74 IN | DIASTOLIC BLOOD PRESSURE: 71 MMHG

## 2024-02-13 DIAGNOSIS — I48.0 PAROXYSMAL ATRIAL FIBRILLATION (MULTI): Primary | ICD-10-CM

## 2024-02-13 DIAGNOSIS — E78.5 DYSLIPIDEMIA: ICD-10-CM

## 2024-02-13 DIAGNOSIS — I10 ESSENTIAL HYPERTENSION: ICD-10-CM

## 2024-02-13 PROCEDURE — 93005 ELECTROCARDIOGRAM TRACING: CPT | Performed by: NURSE PRACTITIONER

## 2024-02-13 PROCEDURE — 3078F DIAST BP <80 MM HG: CPT | Performed by: NURSE PRACTITIONER

## 2024-02-13 PROCEDURE — 3074F SYST BP LT 130 MM HG: CPT | Performed by: NURSE PRACTITIONER

## 2024-02-13 PROCEDURE — 99214 OFFICE O/P EST MOD 30 MIN: CPT | Performed by: NURSE PRACTITIONER

## 2024-02-13 PROCEDURE — 93010 ELECTROCARDIOGRAM REPORT: CPT | Performed by: INTERNAL MEDICINE

## 2024-02-13 PROCEDURE — 1036F TOBACCO NON-USER: CPT | Performed by: NURSE PRACTITIONER

## 2024-02-13 PROCEDURE — 3008F BODY MASS INDEX DOCD: CPT | Performed by: NURSE PRACTITIONER

## 2024-02-13 NOTE — PROGRESS NOTES
Primary Care Physician: Arthur Bond MD  Date of Visit: 02/13/2024 11:00 AM EST  Location of visit: [facility]     Chief Complaint:   Chief Complaint   Patient presents with    Follow-up    Atrial Fibrillation    Hypertension    Hyperlipidemia        HPI / Summary:   Mitch Connor is a 63 y.o. male presents for followup. Seen in collaboration with Dr. Evans. His dyspnea on exertion has improved. His chronic lower extremity edema has improved. He has been maintaining a low sodium diet. His activity is most limited by chronic knee pain. The patient denies chest pain, palpitations, lightheadedness, syncope, orthopnea, paroxysmal nocturnal dyspnea, or bleeding problems.                   Past Medical History:  Past Medical History:   Diagnosis Date    BPH (benign prostatic hyperplasia)     Diabetes mellitus (CMS/HCC)     DVT (deep venous thrombosis) (CMS/HCC)     Hypertension     Sleep apnea     Solitary pulmonary nodule     Lung nodule        Past Surgical History:  Past Surgical History:   Procedure Laterality Date    ABLATION OF DYSRHYTHMIC FOCUS  09/25/2018    Catheter Ablation    ACHILLES TENDON SURGERY  09/25/2018    Subcutaneous Tenotomy Of Achilles Tendon          Social History:   reports that he has never smoked. He has never used smokeless tobacco. He reports current alcohol use of about 5.0 standard drinks of alcohol per week. He reports that he does not currently use drugs.     Family History:  family history includes Colon cancer in his father and paternal grandmother; Colonic polyp in his brother, father, and paternal grandmother; Coronary artery disease in his sister.      Allergies:  Allergies   Allergen Reactions    Metformin Myalgia       Outpatient Medications:  Current Outpatient Medications   Medication Instructions    atorvastatin (LIPITOR) 40 mg, oral, Daily    metoprolol tartrate (LOPRESSOR) 50 mg, oral, 2 times daily    OneTouch Ultra Test strip TEST BLOOD SUGAR ONCE A DAY     "OneTouch Ultra2 Meter misc use as directed.    OneTouch UltraSoft Lancets misc USE TO TEST BLOOD SUGAR ONCE A DAY    Xarelto 20 mg, oral, Daily with evening meal       Physical Exam:  Vitals:    02/13/24 1130   BP: 151/80   BP Location: Left arm   Patient Position: Sitting   BP Cuff Size: Adult   Pulse: 70   SpO2: 96%   Weight: (!) 184 kg (405 lb)   Height: 1.88 m (6' 2\")     Wt Readings from Last 5 Encounters:   02/13/24 (!) 184 kg (405 lb)   01/18/24 (!) 180 kg (397 lb)   01/10/24 (!) 180 kg (397 lb 12.8 oz)   12/09/23 (!) 186 kg (410 lb)   11/28/23 (!) 186 kg (411 lb)     Body mass index is 52 kg/m².     GENERAL: alert, cooperative, pleasant, in no acute distress  SKIN: warm and dry  NECK: Difficult visualizing JVD due to body habitus  CARDIAC: Regular rate and rhythm with no rubs, murmurs, or gallops  CHEST: Normal respiratory efforts, lungs clear to auscultation without wheezing, rhonchi, or crackles  ABDOMEN: soft, nontender, non distended  EXTREMITIES: +2 bilateral lower extremity edema, +2 palpable RP and DP pulses bilaterally       Last Labs:  Recent Labs     12/09/23  0818 10/09/23  1200 09/25/23  1654   WBC 8.1 6.5 7.1   HGB 13.0* 12.9* 13.7   HCT 40.4* 40.7* 41.9    160 181   MCV 84 90 86     Recent Labs     12/09/23  0818 08/24/23  1358 05/09/23  1604    135* 139   K 3.9 4.2 3.8    101 106   BUN 13 10 12   CREATININE 1.10 0.83 0.88     CMP -  Lab Results   Component Value Date    CALCIUM 8.7 12/09/2023    PHOS 2.5 09/14/2021    PROT 6.2 (L) 12/09/2023    ALBUMIN 3.6 12/09/2023    AST 12 12/09/2023    ALT 12 12/09/2023    ALKPHOS 59 12/09/2023    BILITOT 0.8 12/09/2023       LIPID PANEL -   Lab Results   Component Value Date    CHOL 130 04/25/2023    HDL 44.6 04/25/2023    LDLF 74 04/25/2023    TRIG 59 04/25/2023       Lab Results   Component Value Date     (H) 08/18/2018    HGBA1C 8.5 (A) 07/19/2022    HGBA1C 7.1 (H) 05/08/2019       Last Cardiology Tests:  ECG:  Obtained and " reviewed EKG- normal sinus rhythm with sinus arrhythmia HR 70 non specific ST abnormality    Echo:  Echo Results:  11/28/23    CONCLUSIONS:   1. Left ventricular systolic function is normal with a 55-60% estimated ejection fraction.   2. Spectral Doppler shows a pseudonormal pattern of left ventricular diastolic filling.             Assessment/Plan   Mitch was seen today for follow-up, atrial fibrillation, hypertension and hyperlipidemia.  Diagnoses and all orders for this visit:  Paroxysmal atrial fibrillation (CMS/HCC) (Primary)  -     ECG 12 lead (Clinic Performed)  -     CBC; Future  -     Comprehensive metabolic panel; Future  Essential hypertension  Dyslipidemia  -     Lipid panel; Future      In summary Mr. 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   His chronic dyspnea on exertion and lower extremity edema have improved. His volume status is acceptable. His blood pressure is controlled. I have ordered fasting blood work to be done in June.  I have encouraged him to continue weight loss efforts and increase physical activity. We did discuss benefits of weight loss. I did suggest [facility] and GLP1 inhibitor. Patient again declined.  He will continue current cardiovascular medications. He will follow up as scheduled with Dr. Evans.   Orders:  No orders of the defined types were placed in this encounter.     Followup Appts:  Future Appointments   Date Time Provider Department Center   7/30/2024 11:20 AM Bishnu Evans MD [id number]           ____________________________________________________________  Luz Yoon, APRN-CNP  [facility] [place]  [facility]

## 2024-02-13 NOTE — PATIENT INSTRUCTIONS
Consider Mediterranean diet  Continue current cardiovascular medications  Check blood work in June as ordered CBC, CMP, and fasting lipid panel  Follow up with Dr. Evans in July

## 2024-02-26 LAB
ATRIAL RATE: 70 BPM
P AXIS: 93 DEGREES
P OFFSET: 188 MS
P ONSET: 147 MS
PR INTERVAL: 148 MS
Q ONSET: 221 MS
QRS COUNT: 11 BEATS
QRS DURATION: 94 MS
QT INTERVAL: 414 MS
QTC CALCULATION(BAZETT): 447 MS
QTC FREDERICIA: 436 MS
R AXIS: 4 DEGREES
T AXIS: 40 DEGREES
T OFFSET: 428 MS
VENTRICULAR RATE: 70 BPM

## 2024-04-18 ENCOUNTER — APPOINTMENT (OUTPATIENT)
Dept: PRIMARY CARE | Facility: CLINIC | Age: 64
End: 2024-04-18
Payer: COMMERCIAL

## 2024-04-22 ENCOUNTER — OFFICE VISIT (OUTPATIENT)
Dept: PRIMARY CARE | Facility: CLINIC | Age: 64
End: 2024-04-22
Payer: COMMERCIAL

## 2024-04-22 ENCOUNTER — LAB (OUTPATIENT)
Dept: LAB | Facility: LAB | Age: 64
End: 2024-04-22
Payer: COMMERCIAL

## 2024-04-22 ENCOUNTER — HOSPITAL ENCOUNTER (OUTPATIENT)
Dept: RADIOLOGY | Facility: CLINIC | Age: 64
Discharge: HOME | End: 2024-04-22
Payer: COMMERCIAL

## 2024-04-22 VITALS
OXYGEN SATURATION: 98 % | WEIGHT: 315 LBS | HEIGHT: 73 IN | BODY MASS INDEX: 41.75 KG/M2 | HEART RATE: 63 BPM | DIASTOLIC BLOOD PRESSURE: 71 MMHG | SYSTOLIC BLOOD PRESSURE: 128 MMHG

## 2024-04-22 DIAGNOSIS — G89.29 GROIN PAIN, CHRONIC, RIGHT: ICD-10-CM

## 2024-04-22 DIAGNOSIS — Z12.5 SCREENING FOR PROSTATE CANCER: ICD-10-CM

## 2024-04-22 DIAGNOSIS — R35.0 URINARY FREQUENCY: ICD-10-CM

## 2024-04-22 DIAGNOSIS — Z86.711 HISTORY OF PULMONARY EMBOLISM: ICD-10-CM

## 2024-04-22 DIAGNOSIS — Z79.01 LONG TERM (CURRENT) USE OF ANTICOAGULANTS: ICD-10-CM

## 2024-04-22 DIAGNOSIS — G89.29 CHRONIC PAIN OF RIGHT KNEE: ICD-10-CM

## 2024-04-22 DIAGNOSIS — E11.40 TYPE 2 DIABETES MELLITUS WITH DIABETIC NEUROPATHY, WITHOUT LONG-TERM CURRENT USE OF INSULIN (MULTI): ICD-10-CM

## 2024-04-22 DIAGNOSIS — N40.1 BENIGN PROSTATIC HYPERPLASIA WITH URINARY FREQUENCY: ICD-10-CM

## 2024-04-22 DIAGNOSIS — I82.4Z9 DEEP VEIN THROMBOSIS (DVT) OF DISTAL VEIN OF LOWER EXTREMITY, UNSPECIFIED CHRONICITY, UNSPECIFIED LATERALITY (MULTI): ICD-10-CM

## 2024-04-22 DIAGNOSIS — M25.561 CHRONIC PAIN OF RIGHT KNEE: ICD-10-CM

## 2024-04-22 DIAGNOSIS — I10 ESSENTIAL HYPERTENSION: ICD-10-CM

## 2024-04-22 DIAGNOSIS — R39.198 DIFFICULTY IN VOIDING: ICD-10-CM

## 2024-04-22 DIAGNOSIS — Z13.29 SCREENING FOR THYROID DISORDER: ICD-10-CM

## 2024-04-22 DIAGNOSIS — N39.43 POST-VOID DRIBBLING: ICD-10-CM

## 2024-04-22 DIAGNOSIS — R10.31 GROIN PAIN, CHRONIC, RIGHT: ICD-10-CM

## 2024-04-22 DIAGNOSIS — I82.4Y2 DEEP VEIN THROMBOSIS (DVT) OF PROXIMAL VEIN OF LEFT LOWER EXTREMITY, UNSPECIFIED CHRONICITY (MULTI): ICD-10-CM

## 2024-04-22 DIAGNOSIS — R35.0 BENIGN PROSTATIC HYPERPLASIA WITH URINARY FREQUENCY: ICD-10-CM

## 2024-04-22 DIAGNOSIS — I10 HYPERTENSION, UNSPECIFIED TYPE: ICD-10-CM

## 2024-04-22 DIAGNOSIS — E66.01 CLASS 3 SEVERE OBESITY DUE TO EXCESS CALORIES WITH SERIOUS COMORBIDITY AND BODY MASS INDEX (BMI) OF 50.0 TO 59.9 IN ADULT (MULTI): ICD-10-CM

## 2024-04-22 DIAGNOSIS — R32 URINARY INCONTINENCE, UNSPECIFIED TYPE: ICD-10-CM

## 2024-04-22 DIAGNOSIS — G47.33 OBSTRUCTIVE SLEEP APNEA: ICD-10-CM

## 2024-04-22 DIAGNOSIS — I48.21 PERMANENT ATRIAL FIBRILLATION (MULTI): ICD-10-CM

## 2024-04-22 DIAGNOSIS — Z00.00 ANNUAL PHYSICAL EXAM: Primary | Chronic | ICD-10-CM

## 2024-04-22 LAB
APPEARANCE UR: ABNORMAL
BILIRUB UR STRIP.AUTO-MCNC: NEGATIVE MG/DL
COLOR UR: ABNORMAL
CREAT UR-MCNC: 215.7 MG/DL (ref 20–370)
EST. AVERAGE GLUCOSE BLD GHB EST-MCNC: 186 MG/DL
GLUCOSE UR STRIP.AUTO-MCNC: ABNORMAL MG/DL
HBA1C MFR BLD: 8.1 %
KETONES UR STRIP.AUTO-MCNC: NEGATIVE MG/DL
LEUKOCYTE ESTERASE UR QL STRIP.AUTO: NEGATIVE
MICROALBUMIN UR-MCNC: 26.6 MG/L
MICROALBUMIN/CREAT UR: 12.3 UG/MG CREAT
MUCOUS THREADS #/AREA URNS AUTO: ABNORMAL /LPF
NITRITE UR QL STRIP.AUTO: NEGATIVE
PH UR STRIP.AUTO: 5 [PH]
PROT UR STRIP.AUTO-MCNC: ABNORMAL MG/DL
PSA SERPL-MCNC: 2.34 NG/ML
RBC # UR STRIP.AUTO: ABNORMAL /UL
RBC #/AREA URNS AUTO: ABNORMAL /HPF
SP GR UR STRIP.AUTO: 1.02
TSH SERPL-ACNC: 1.24 MIU/L (ref 0.44–3.98)
UROBILINOGEN UR STRIP.AUTO-MCNC: NORMAL MG/DL
WBC #/AREA URNS AUTO: ABNORMAL /HPF

## 2024-04-22 PROCEDURE — 3078F DIAST BP <80 MM HG: CPT | Performed by: INTERNAL MEDICINE

## 2024-04-22 PROCEDURE — 73562 X-RAY EXAM OF KNEE 3: CPT | Mod: RIGHT SIDE | Performed by: STUDENT IN AN ORGANIZED HEALTH CARE EDUCATION/TRAINING PROGRAM

## 2024-04-22 PROCEDURE — 81001 URINALYSIS AUTO W/SCOPE: CPT

## 2024-04-22 PROCEDURE — 73502 X-RAY EXAM HIP UNI 2-3 VIEWS: CPT | Mod: RT

## 2024-04-22 PROCEDURE — 73562 X-RAY EXAM OF KNEE 3: CPT | Mod: RT

## 2024-04-22 PROCEDURE — 82570 ASSAY OF URINE CREATININE: CPT

## 2024-04-22 PROCEDURE — 73502 X-RAY EXAM HIP UNI 2-3 VIEWS: CPT | Mod: RIGHT SIDE | Performed by: STUDENT IN AN ORGANIZED HEALTH CARE EDUCATION/TRAINING PROGRAM

## 2024-04-22 PROCEDURE — 84443 ASSAY THYROID STIM HORMONE: CPT

## 2024-04-22 PROCEDURE — 3008F BODY MASS INDEX DOCD: CPT | Performed by: INTERNAL MEDICINE

## 2024-04-22 PROCEDURE — 82043 UR ALBUMIN QUANTITATIVE: CPT

## 2024-04-22 PROCEDURE — 1036F TOBACCO NON-USER: CPT | Performed by: INTERNAL MEDICINE

## 2024-04-22 PROCEDURE — 3074F SYST BP LT 130 MM HG: CPT | Performed by: INTERNAL MEDICINE

## 2024-04-22 PROCEDURE — 84153 ASSAY OF PSA TOTAL: CPT

## 2024-04-22 PROCEDURE — 99396 PREV VISIT EST AGE 40-64: CPT | Performed by: INTERNAL MEDICINE

## 2024-04-22 PROCEDURE — 83036 HEMOGLOBIN GLYCOSYLATED A1C: CPT

## 2024-04-22 PROCEDURE — 36415 COLL VENOUS BLD VENIPUNCTURE: CPT

## 2024-04-22 RX ORDER — METOPROLOL TARTRATE 50 MG/1
50 TABLET ORAL 2 TIMES DAILY
Qty: 180 TABLET | Refills: 2 | Status: SHIPPED | OUTPATIENT
Start: 2024-04-22 | End: 2024-05-08 | Stop reason: SDUPTHER

## 2024-04-22 RX ORDER — RIVAROXABAN 20 MG/1
20 TABLET, FILM COATED ORAL
Qty: 90 TABLET | Refills: 3 | Status: SHIPPED | OUTPATIENT
Start: 2024-04-22 | End: 2024-05-08 | Stop reason: SDUPTHER

## 2024-04-22 RX ORDER — GLIPIZIDE 5 MG/1
5 TABLET ORAL DAILY
COMMUNITY

## 2024-04-22 ASSESSMENT — ENCOUNTER SYMPTOMS
LOSS OF SENSATION IN FEET: 0
CONSTITUTIONAL NEGATIVE: 1
FREQUENCY: 1
OCCASIONAL FEELINGS OF UNSTEADINESS: 0
DIFFICULTY URINATING: 1
DEPRESSION: 0

## 2024-04-22 ASSESSMENT — PATIENT HEALTH QUESTIONNAIRE - PHQ9
2. FEELING DOWN, DEPRESSED OR HOPELESS: NOT AT ALL
SUM OF ALL RESPONSES TO PHQ9 QUESTIONS 1 AND 2: 0
2. FEELING DOWN, DEPRESSED OR HOPELESS: NOT AT ALL
1. LITTLE INTEREST OR PLEASURE IN DOING THINGS: NOT AT ALL
1. LITTLE INTEREST OR PLEASURE IN DOING THINGS: NOT AT ALL
SUM OF ALL RESPONSES TO PHQ9 QUESTIONS 1 AND 2: 0
SUM OF ALL RESPONSES TO PHQ9 QUESTIONS 1 AND 2: 0
1. LITTLE INTEREST OR PLEASURE IN DOING THINGS: NOT AT ALL
2. FEELING DOWN, DEPRESSED OR HOPELESS: NOT AT ALL

## 2024-04-22 NOTE — PROGRESS NOTES
"Patient ID: Mitch Connor is a 63 y.o. male who presents for Annual Exam, Knee Pain (Right knee), and Urinary Incontinence.    /71   Pulse 63   Ht 1.842 m (6' 0.5\")   Wt (!) 183 kg (404 lb)   SpO2 98%   BMI 54.04 kg/m²     HPI      I AM HAVING PAIN IN THE RT GROIN   APPRXM A MONTH     RT KNEE PAIN   HURTS TO WALK  HURTS TO MOVE UP AND DOWN THE STAIRS     HX OF NEPHROLITHIASIS       BPH WITH INTERMITTENT URINARY FREQUENCY       ATRIAL FIBRILLATION   ON XARELTO 20MG A DAY     S/P CATHETER ABLATION       HX OF PULM EMBOLISM     HX OF DVT       MORBIDLY OBESE       OBSTRUCTIVE SLEEP APNEA       DIABETES WITH NEUROPATHY           Subjective     Review of Systems   Constitutional: Negative.    Genitourinary:  Positive for difficulty urinating, frequency and urgency.   Musculoskeletal:         RT KNEE PAIN     RT GROIN PAIN   RT HIP PAIN    All other systems reviewed and are negative.      Objective     Physical Exam  Vitals and nursing note reviewed.   Constitutional:       Appearance: He is obese.   Neck:      Vascular: No carotid bruit.   Cardiovascular:      Rate and Rhythm: Rhythm irregular.      Pulses: Normal pulses.      Heart sounds: No murmur heard.  Pulmonary:      Effort: Pulmonary effort is normal. No respiratory distress.      Breath sounds: Normal breath sounds. No stridor. No wheezing, rhonchi or rales.   Chest:      Chest wall: No tenderness.   Musculoskeletal:      Comments: RT HIP EXTERNAL/INTERNAL ROTATION RESTRICTED       RT KNEE FLEXION/EXTENSION RESTRICTED   AND PAINFUL , NO SOFT TISSUE SWELLING    Lymphadenopathy:      Cervical: No cervical adenopathy.         Lab Results   Component Value Date    WBC 8.1 12/09/2023    HGB 13.0 (L) 12/09/2023    HCT 40.4 (L) 12/09/2023    MCV 84 12/09/2023     12/09/2023           Problem List Items Addressed This Visit       Obstructive sleep apnea    Permanent atrial fibrillation (Multi)    Relevant Medications    metoprolol tartrate " (Lopressor) 50 mg tablet    Xarelto 20 mg tablet    Left leg DVT (Multi)    Relevant Medications    Xarelto 20 mg tablet    Class 3 severe obesity with serious comorbidity and body mass index (BMI) of 50.0 to 59.9 in adult (Multi)    Benign prostatic hyperplasia with urinary frequency    DVT (deep venous thrombosis) (Multi)    Essential hypertension    History of pulmonary embolism    Diabetes mellitus (Multi)     STABLE          Relevant Orders    Hemoglobin A1c    Albumin, urine, random    Long term (current) use of anticoagulants    Annual physical exam - Primary     Other Visit Diagnoses       Screening for prostate cancer        Relevant Orders    Prostate Spec.Ag,Screen    Chronic pain of right knee        Relevant Orders    XR knee right 3 views    Screening for thyroid disorder        Relevant Orders    Tsh With Reflex To Free T4 If Abnormal    Groin pain, chronic, right        Relevant Orders    XR hip right with pelvis when performed 2 or 3 views    Urinary frequency        Relevant Orders    Urinalysis with Reflex Microscopic    Urine Culture    US bladder    Urinary incontinence, unspecified type        Relevant Orders    Referral to Urology    US bladder    Difficulty in voiding        Relevant Orders    US bladder    Post-void dribbling        Relevant Orders    US bladder    Hypertension, unspecified type        Relevant Medications    metoprolol tartrate (Lopressor) 50 mg tablet                   A/P         PSA , TSH , A1C , URINE MICROALBUMIN , URINE  analysis   URINE CULTURE   METOPROLOL TARTRATE 50MG 1 PILL BID FILLED  XARELTO 20MG 1 PILL EVERY DAY   [facility]   ULTRASOUND BLADDER   X-RAY RT HIP   XR RT KNEE

## 2024-04-23 DIAGNOSIS — M16.11 ARTHRITIS OF RIGHT HIP: ICD-10-CM

## 2024-04-23 DIAGNOSIS — M17.11 ARTHRITIS OF RIGHT KNEE: Primary | ICD-10-CM

## 2024-04-23 DIAGNOSIS — E11.65 UNCONTROLLED TYPE 2 DIABETES MELLITUS WITH HYPERGLYCEMIA (MULTI): Primary | ICD-10-CM

## 2024-04-30 DIAGNOSIS — E78.5 HYPERLIPIDEMIA, UNSPECIFIED HYPERLIPIDEMIA TYPE: ICD-10-CM

## 2024-05-02 RX ORDER — ATORVASTATIN CALCIUM 40 MG/1
40 TABLET, FILM COATED ORAL DAILY
Qty: 90 TABLET | Refills: 1 | Status: SHIPPED | OUTPATIENT
Start: 2024-05-02

## 2024-05-07 ENCOUNTER — TELEPHONE (OUTPATIENT)
Dept: PRIMARY CARE | Facility: CLINIC | Age: 64
End: 2024-05-07
Payer: COMMERCIAL

## 2024-05-07 NOTE — TELEPHONE ENCOUNTER
Pt. Is requesting his rx's for Metoprolol and Xarelto to go to [facility] on file. He had wanted them to go to mail order and now he is almost out.. Asking if you could send a month supply to [facility], and also a 90 day supply to mail order.

## 2024-05-08 DIAGNOSIS — I48.21 PERMANENT ATRIAL FIBRILLATION (MULTI): ICD-10-CM

## 2024-05-08 DIAGNOSIS — I82.4Y2 DEEP VEIN THROMBOSIS (DVT) OF PROXIMAL VEIN OF LEFT LOWER EXTREMITY, UNSPECIFIED CHRONICITY (MULTI): ICD-10-CM

## 2024-05-08 DIAGNOSIS — I10 HYPERTENSION, UNSPECIFIED TYPE: ICD-10-CM

## 2024-05-08 RX ORDER — RIVAROXABAN 20 MG/1
20 TABLET, FILM COATED ORAL
Qty: 30 TABLET | Refills: 0 | Status: SHIPPED | OUTPATIENT
Start: 2024-05-08 | End: 2024-08-06

## 2024-05-08 RX ORDER — METOPROLOL TARTRATE 50 MG/1
50 TABLET ORAL 2 TIMES DAILY
Qty: 60 TABLET | Refills: 0 | Status: SHIPPED | OUTPATIENT
Start: 2024-05-08 | End: 2024-06-03

## 2024-06-01 DIAGNOSIS — I48.21 PERMANENT ATRIAL FIBRILLATION (MULTI): ICD-10-CM

## 2024-06-01 DIAGNOSIS — I10 HYPERTENSION, UNSPECIFIED TYPE: ICD-10-CM

## 2024-06-03 RX ORDER — METOPROLOL TARTRATE 50 MG/1
50 TABLET ORAL 2 TIMES DAILY
Qty: 180 TABLET | Refills: 3 | Status: SHIPPED | OUTPATIENT
Start: 2024-06-03

## 2024-06-17 ENCOUNTER — TELEPHONE (OUTPATIENT)
Dept: CARDIOLOGY | Facility: HOSPITAL | Age: 64
End: 2024-06-17
Payer: COMMERCIAL

## 2024-06-17 ENCOUNTER — TELEPHONE (OUTPATIENT)
Dept: PRIMARY CARE | Facility: CLINIC | Age: 64
End: 2024-06-17
Payer: COMMERCIAL

## 2024-06-17 DIAGNOSIS — I82.4Y2 DEEP VEIN THROMBOSIS (DVT) OF PROXIMAL VEIN OF LEFT LOWER EXTREMITY, UNSPECIFIED CHRONICITY (MULTI): ICD-10-CM

## 2024-06-17 DIAGNOSIS — I48.21 PERMANENT ATRIAL FIBRILLATION (MULTI): ICD-10-CM

## 2024-06-17 NOTE — TELEPHONE ENCOUNTER
Patient called for a refill on his xarelto 20 mg to be sent to [facility] [address] in [place]    Thank you.

## 2024-07-08 DIAGNOSIS — I82.4Y2 DEEP VEIN THROMBOSIS (DVT) OF PROXIMAL VEIN OF LEFT LOWER EXTREMITY, UNSPECIFIED CHRONICITY (MULTI): ICD-10-CM

## 2024-07-08 DIAGNOSIS — I48.21 PERMANENT ATRIAL FIBRILLATION (MULTI): ICD-10-CM

## 2024-07-08 DIAGNOSIS — I10 HYPERTENSION, UNSPECIFIED TYPE: ICD-10-CM

## 2024-07-08 RX ORDER — METOPROLOL TARTRATE 50 MG/1
50 TABLET ORAL 2 TIMES DAILY
Qty: 180 TABLET | Refills: 3 | Status: SHIPPED | OUTPATIENT
Start: 2024-07-08 | End: 2024-07-10 | Stop reason: SDUPTHER

## 2024-07-09 DIAGNOSIS — I48.21 PERMANENT ATRIAL FIBRILLATION (MULTI): Primary | ICD-10-CM

## 2024-07-09 DIAGNOSIS — I10 HYPERTENSION, UNSPECIFIED TYPE: ICD-10-CM

## 2024-07-09 DIAGNOSIS — I82.4Y2 DEEP VEIN THROMBOSIS (DVT) OF PROXIMAL VEIN OF LEFT LOWER EXTREMITY, UNSPECIFIED CHRONICITY (MULTI): ICD-10-CM

## 2024-07-09 NOTE — TELEPHONE ENCOUNTER
Patient requesting his xarelto and metoprolol be sent to [facility]. Looks like his pcp sent to local but he would like it sent to mail order.

## 2024-07-10 RX ORDER — METOPROLOL TARTRATE 50 MG/1
50 TABLET ORAL 2 TIMES DAILY
Qty: 180 TABLET | Refills: 3 | Status: SHIPPED | OUTPATIENT
Start: 2024-07-10

## 2024-07-18 ENCOUNTER — HOSPITAL ENCOUNTER (OUTPATIENT)
Dept: RADIOLOGY | Facility: HOSPITAL | Age: 64
Discharge: HOME | End: 2024-07-18
Payer: COMMERCIAL

## 2024-07-18 DIAGNOSIS — R31.21 ASYMPTOMATIC MICROSCOPIC HEMATURIA: ICD-10-CM

## 2024-07-18 PROCEDURE — 74176 CT ABD & PELVIS W/O CONTRAST: CPT

## 2024-07-18 PROCEDURE — 74176 CT ABD & PELVIS W/O CONTRAST: CPT | Performed by: RADIOLOGY

## 2024-07-28 ENCOUNTER — APPOINTMENT (OUTPATIENT)
Dept: RADIOLOGY | Facility: HOSPITAL | Age: 64
End: 2024-07-28
Payer: COMMERCIAL

## 2024-07-28 ENCOUNTER — HOSPITAL ENCOUNTER (EMERGENCY)
Facility: HOSPITAL | Age: 64
Discharge: HOME | End: 2024-07-28
Payer: COMMERCIAL

## 2024-07-28 VITALS
HEIGHT: 74 IN | RESPIRATION RATE: 16 BRPM | BODY MASS INDEX: 40.43 KG/M2 | DIASTOLIC BLOOD PRESSURE: 89 MMHG | TEMPERATURE: 97.7 F | OXYGEN SATURATION: 97 % | SYSTOLIC BLOOD PRESSURE: 169 MMHG | HEART RATE: 80 BPM | WEIGHT: 315 LBS

## 2024-07-28 DIAGNOSIS — M17.11 PRIMARY OSTEOARTHRITIS OF RIGHT KNEE: ICD-10-CM

## 2024-07-28 DIAGNOSIS — M25.561 ACUTE PAIN OF RIGHT KNEE: Primary | ICD-10-CM

## 2024-07-28 PROCEDURE — 93971 EXTREMITY STUDY: CPT

## 2024-07-28 PROCEDURE — 73564 X-RAY EXAM KNEE 4 OR MORE: CPT | Mod: RIGHT SIDE | Performed by: RADIOLOGY

## 2024-07-28 PROCEDURE — 93971 EXTREMITY STUDY: CPT | Performed by: STUDENT IN AN ORGANIZED HEALTH CARE EDUCATION/TRAINING PROGRAM

## 2024-07-28 PROCEDURE — 99284 EMERGENCY DEPT VISIT MOD MDM: CPT | Mod: 25

## 2024-07-28 PROCEDURE — 73564 X-RAY EXAM KNEE 4 OR MORE: CPT | Mod: RT

## 2024-07-28 ASSESSMENT — PAIN SCALES - GENERAL: PAINLEVEL_OUTOF10: 10 - WORST POSSIBLE PAIN

## 2024-07-28 ASSESSMENT — COLUMBIA-SUICIDE SEVERITY RATING SCALE - C-SSRS
6. HAVE YOU EVER DONE ANYTHING, STARTED TO DO ANYTHING, OR PREPARED TO DO ANYTHING TO END YOUR LIFE?: NO
2. HAVE YOU ACTUALLY HAD ANY THOUGHTS OF KILLING YOURSELF?: NO
1. IN THE PAST MONTH, HAVE YOU WISHED YOU WERE DEAD OR WISHED YOU COULD GO TO SLEEP AND NOT WAKE UP?: NO

## 2024-07-28 ASSESSMENT — PAIN - FUNCTIONAL ASSESSMENT: PAIN_FUNCTIONAL_ASSESSMENT: 0-10

## 2024-07-28 NOTE — ED PROVIDER NOTES
No chief complaint on file.    HPI:   Mitch Connor is an 63 y.o. male with a history of morbid obesity, A-fib on Xarelto, diabetes, HTN, LACEY, DVT, PE, arthritis presenting to the ED with a right knee injury.  He explains that on Friday he experienced a sharp pain in his knee which caused him to fall down.  He denies falling onto the knee.  He explains afterwards he got into the car and was driving from [place] and with him he arrived back home about 4 hours later he was unable to bear weight on the knee.  He reports increased pain in the whole right lower extremity.  He has been ambulating with crutches.  Explains that he has pain when he extends the knee.  Denies numbness or tingling.  He did report some pain into the groin which is since resolved.  He has known osteoarthritis of the right hip and knee and has received injections into the knee previously.    Medications: Atorvastatin, metoprolol, Xarelto, glipizide  Soc HX: Denies substances  Allergies   Allergen Reactions    Metformin Myalgia   :  Past Medical History:   Diagnosis Date    BPH (benign prostatic hyperplasia)     Diabetes mellitus (Multi)     DVT (deep venous thrombosis) (Multi)     Hypertension     Sleep apnea     Solitary pulmonary nodule     Lung nodule     Past Surgical History:   Procedure Laterality Date    ABLATION OF DYSRHYTHMIC FOCUS  09/25/2018    Catheter Ablation    ACHILLES TENDON SURGERY  09/25/2018    Subcutaneous Tenotomy Of Achilles Tendon     Family History   Problem Relation Name Age of Onset    Colonic polyp Father      Colon cancer Father      Coronary artery disease Sister      Colonic polyp Brother      Colonic polyp Paternal Grandmother      Colon cancer Paternal Grandmother          Physical Exam  Vitals and nursing note reviewed.   Constitutional:       General: He is not in acute distress.     Appearance: He is well-developed.      Comments: Morbidly obese   HENT:      Head: Normocephalic and atraumatic.      Nose: Nose  normal.      Mouth/Throat:      Mouth: Mucous membranes are moist.      Pharynx: Oropharynx is clear.   Eyes:      Extraocular Movements: Extraocular movements intact.      Conjunctiva/sclera: Conjunctivae normal.      Pupils: Pupils are equal, round, and reactive to light.   Cardiovascular:      Rate and Rhythm: Normal rate and regular rhythm.      Heart sounds: No murmur heard.  Pulmonary:      Effort: Pulmonary effort is normal. No respiratory distress.      Breath sounds: Normal breath sounds.   Abdominal:      Palpations: Abdomen is soft.      Tenderness: There is no abdominal tenderness.   Musculoskeletal:         General: Normal range of motion.      Cervical back: Neck supple.      Right lower leg: Edema present.      Left lower leg: Edema present.      Comments: Exam of the right lower extremity shows limited range of motion of the hip.  Positive extension lag at the knee.  There is an effusion.  Patella is tracking nicely however crepitance with motion.  Tender over the medial joint line.   Skin:     General: Skin is warm and dry.      Capillary Refill: Capillary refill takes less than 2 seconds.      Comments: Distal skin discoloration and bilateral distal edema looking chronic in nature   Neurological:      General: No focal deficit present.      Mental Status: He is alert.      Sensory: No sensory deficit.   Psychiatric:         Mood and Affect: Mood normal.        VS: As documented in the triage note and EMR flowsheet from this visit were reviewed.    Medical Decision Making: This is a 63-year-old male presenting to the ED with chief complaint of right knee pain.  He does have known osteoarthritis of the knee and has received cortisone injections in it in the past.  He reports increased pain with weightbearing and alleviated with rest.  On exam the patient's vitals are stable is afebrile no acute distress.  He is sitting in a wheelchair.  he does have an obvious effusion of the right knee.  The knee is  not significantly red hot or tender.  Low suspicion for septic arthritis.  He is also afebrile.  Discussed with him possibly draining the knee today however it is not significantly swollen enough.  Due to the trauma and mechanism I have low suspicion of gout and he does have range of motion of the knee without significant pain.  X-rays did reveal significant osteoarthritis of the knee.  He likely irritated the arthritis in his knee.  Likely needs another cortisone injection into it.  He does have a history of HTN so anti-inflammatories were not prescribed.  Recommended ice and Tylenol for pain management and follow-up with orthopedics.  He did suggest that he wants to see Dr. Huang however I did recommend he takes first available appointment.  Differential includes sprain, strain, fracture, dislocation, tendon rupture, meniscal injury, ligament rupture, gout  Diagnoses as of 07/28/24 1500   Acute pain of right knee   Primary osteoarthritis of right knee     Counseling: Spoke with the patient and discussed today´s findings, in addition to providing specific details for the plan of care and expected course.  Patient was given the opportunity to ask questions.    Discussed return precautions and importance of follow-up.  Advised to follow-up with orthopedics.  I specifically advised to return to the ED for changing or worsening symptoms, new symptoms, complaint specific precautions, and precautions listed on the discharge paperwork.  Educated on the common potential side effects of medications prescribed.    I advised the patient that the emergency evaluation and treatment provided today doesn't end their need for medical care. It is very important that they follow-up with their primary care provider or other specialist as instructed.    The plan of care was mutually agreed upon with the patient. The patient and/or family were given the opportunity to ask questions. All questions asked today in the ED were answered  to the best of my ability with today's information.    This patient was cared for in the setting of nationwide stress on resources and staffing.    This report was transcribed using voice recognition software.  Every effort was made to ensure accuracy, however, inadvertently computerized transcription errors may be present.       Jace Lowry PA-C  07/28/24 [id number]       Jace Lowry PA-C  07/28/24 [id number]

## 2024-07-30 ENCOUNTER — APPOINTMENT (OUTPATIENT)
Dept: CARDIOLOGY | Facility: HOSPITAL | Age: 64
End: 2024-07-30
Payer: COMMERCIAL

## 2024-07-30 DIAGNOSIS — I48.20 CHRONIC ATRIAL FIBRILLATION (MULTI): ICD-10-CM

## 2024-08-06 ENCOUNTER — APPOINTMENT (OUTPATIENT)
Dept: ORTHOPEDIC SURGERY | Facility: CLINIC | Age: 64
End: 2024-08-06
Payer: COMMERCIAL

## 2024-08-06 ENCOUNTER — OFFICE VISIT (OUTPATIENT)
Dept: CARDIOLOGY | Facility: HOSPITAL | Age: 64
End: 2024-08-06
Payer: COMMERCIAL

## 2024-08-06 ENCOUNTER — HOSPITAL ENCOUNTER (OUTPATIENT)
Dept: CARDIOLOGY | Facility: HOSPITAL | Age: 64
Discharge: HOME | End: 2024-08-06
Payer: COMMERCIAL

## 2024-08-06 VITALS
WEIGHT: 315 LBS | BODY MASS INDEX: 40.43 KG/M2 | SYSTOLIC BLOOD PRESSURE: 108 MMHG | DIASTOLIC BLOOD PRESSURE: 68 MMHG | HEIGHT: 74 IN | HEART RATE: 68 BPM | OXYGEN SATURATION: 95 %

## 2024-08-06 DIAGNOSIS — E78.5 DYSLIPIDEMIA: ICD-10-CM

## 2024-08-06 DIAGNOSIS — E11.9 TYPE 2 DIABETES MELLITUS WITHOUT COMPLICATION, WITHOUT LONG-TERM CURRENT USE OF INSULIN (MULTI): ICD-10-CM

## 2024-08-06 DIAGNOSIS — I48.19 PERSISTENT ATRIAL FIBRILLATION (MULTI): Primary | ICD-10-CM

## 2024-08-06 PROCEDURE — 3078F DIAST BP <80 MM HG: CPT | Performed by: INTERNAL MEDICINE

## 2024-08-06 PROCEDURE — 3061F NEG MICROALBUMINURIA REV: CPT | Performed by: INTERNAL MEDICINE

## 2024-08-06 PROCEDURE — 3074F SYST BP LT 130 MM HG: CPT | Performed by: INTERNAL MEDICINE

## 2024-08-06 PROCEDURE — 93005 ELECTROCARDIOGRAM TRACING: CPT

## 2024-08-06 PROCEDURE — 99214 OFFICE O/P EST MOD 30 MIN: CPT | Performed by: INTERNAL MEDICINE

## 2024-08-06 PROCEDURE — 3008F BODY MASS INDEX DOCD: CPT | Performed by: INTERNAL MEDICINE

## 2024-08-06 PROCEDURE — 3052F HG A1C>EQUAL 8.0%<EQUAL 9.0%: CPT | Performed by: INTERNAL MEDICINE

## 2024-08-06 PROCEDURE — 1036F TOBACCO NON-USER: CPT | Performed by: INTERNAL MEDICINE

## 2024-08-06 ASSESSMENT — ENCOUNTER SYMPTOMS
LOSS OF SENSATION IN FEET: 0
DEPRESSION: 0
OCCASIONAL FEELINGS OF UNSTEADINESS: 0

## 2024-08-06 NOTE — PATIENT INSTRUCTIONS
[facility] clinic referral.  Check CMP, CBC, lipid profile.  Continue to work on losing weight.  Follow-up in 6 months.

## 2024-08-06 NOTE — PROGRESS NOTES
Primary Care Physician: Arthur Bond MD  Date of Visit: 08/06/2024  3:00 PM EDT  Location of visit: [facility]     Chief Complaint:   No chief complaint on file.       HPI / Summary:   Mitch Connor is a 63 y.o. male presents for followup.  He has no cardiac complaints.  He is being followed by urology for nephrolithiasis.  He did have intermittent hematuria that has improved.  The patient denies chest pain, shortness of breath, palpitations, lightheadedness, syncope, orthopnea, paroxysmal nocturnal dyspnea, lower extremity edema, or other bleeding problems.          Past Medical History:   Diagnosis Date    BPH (benign prostatic hyperplasia)     Diabetes mellitus (Multi)     DVT (deep venous thrombosis) (Multi)     Hypertension     Sleep apnea     Solitary pulmonary nodule     Lung nodule        Past Surgical History:   Procedure Laterality Date    ABLATION OF DYSRHYTHMIC FOCUS  09/25/2018    Catheter Ablation    ACHILLES TENDON SURGERY  09/25/2018    Subcutaneous Tenotomy Of Achilles Tendon          Social History:   reports that he has never smoked. He has never used smokeless tobacco. He reports that he does not currently use alcohol after a past usage of about 5.0 standard drinks of alcohol per week. He reports that he does not currently use drugs.     Family History:  family history includes Colon cancer in his father and paternal grandmother; Colonic polyp in his brother, father, and paternal grandmother; Coronary artery disease in his sister.      Allergies:  Allergies   Allergen Reactions    Metformin Myalgia       Outpatient Medications:  Current Outpatient Medications   Medication Instructions    atorvastatin (LIPITOR) 40 mg, oral, Daily, ----DUE FOR LABS    glipiZIDE (GLUCOTROL) 5 mg, oral, Daily    metoprolol tartrate (LOPRESSOR) 50 mg, oral, 2 times daily    OneTouch Ultra Test strip TEST BLOOD SUGAR ONCE A DAY    OneTouch Ultra2 Meter misc use as directed.    OneTouch UltraSoft Lancets  "misc USE TO TEST BLOOD SUGAR ONCE A DAY    rivaroxaban (XARELTO) 20 mg, oral, Every evening, Take with meal       Physical Exam:  Vitals:    08/06/24 1510   BP: 108/68   Pulse: 68   SpO2: 95%   Weight: (!) 177 kg (390 lb)   Height: 1.88 m (6' 2\")     Wt Readings from Last 5 Encounters:   08/06/24 (!) 177 kg (390 lb)   07/28/24 (!) 179 kg (395 lb)   04/22/24 (!) 183 kg (404 lb)   02/13/24 (!) 184 kg (405 lb)   01/18/24 (!) 180 kg (397 lb)     Body mass index is 50.07 kg/m².   General: Well-developed well-nourished in no acute distress  HEENT: Normocephalic atraumatic  Neck: Supple, JVP is normal negative hepatojugular reflux 2+ carotid pulses without bruit  Pulmonary: Normal respiratory effort, clear to auscultation  Cardiovascular: No right ventricular heave, normal S1 and S2, no murmurs rubs or gallops  Abdomen: Soft nontender nondistended  Extremities: Warm without edema 2+ radial pulses bilaterally   Neurologic: Alert and oriented x3  Psychiatric: Normal mood and affect     Last Labs:  CMP:  Recent Labs     12/09/23  0818 08/24/23  1358 05/09/23  1604    135* 139   K 3.9 4.2 3.8    101 106   CO2 24 28 25   ANIONGAP 14 10 12   BUN 13 10 12   CREATININE 1.10 0.83 0.88   EGFR 76  --   --    GLUCOSE 161* 211* 139*     Recent Labs     12/09/23  0818 08/24/23  1358 05/09/23  1604   ALBUMIN 3.6 3.5 3.6   ALKPHOS 59 63 64   ALT 12 15 14   AST 12 16 16   BILITOT 0.8 1.0 0.7     CBC:  Recent Labs     12/09/23  0818 10/09/23  1200 09/25/23  1654   WBC 8.1 6.5 7.1   HGB 13.0* 12.9* 13.7   HCT 40.4* 40.7* 41.9    160 181   MCV 84 90 86     COAG:   Recent Labs     10/28/21  0550 08/18/18  1258   INR 1.8* 1.9*     HEME/ENDO:  Recent Labs     04/22/24  1116 07/19/22  0844 09/14/21  2240 06/23/21  1102   TSH 1.24 1.56 1.39  --    HGBA1C 8.1* 8.5*  --  8.1      CARDIAC:   Recent Labs     08/18/18  1258   *     Recent Labs     04/25/23  1050 06/23/21  1102 01/15/20  1200   CHOL 130 112 99   LDLF 74 62 48 "   HDL 44.6 37.1* 37.6*   TRIG 59 65 67       Last Cardiology Tests:  ECG:  An electrocardiogram performed today that I reviewed shows normal sinus rhythm with a PVC.    Echo:  Echo Results:  Transthoracic Echo (TTE) Complete With Contrast 11/28/2023    [facility], [address] [place] [address]  Tel [phone] and Fax [phone]    TRANSTHORACIC ECHOCARDIOGRAM REPORT      Patient Name:      BLAKE Garcia Physician:    [id number] Bishnu Evans MD  Study Date:        11/28/2023           Ordering Provider:    [id number] BARRINGTON AMARO  MRN/PID:           [id number]             Fellow:  Accession#:        [id number]         Nurse:  Date of Birth/Age: 1960 / 63      Sonographer:          Claribel Martin RDCS  years  Gender:            M                    Additional Staff:  Height:            188.00 cm            Admit Date:  Weight:            186.00 kg            Admission Status:     Outpatient  BSA:               2.95 m2              Encounter#:           [id number]  Department Location:  [facility] Non  Invasive  Blood Pressure: 114 /70 mmHg    Study Type:    TRANSTHORACIC ECHO (TTE) COMPLETE  Diagnosis/ICD: Other forms of dyspnea-R06.09  Indication:    Dyspnea  CPT Code:      Echo Complete w Full Doppler-99427    Patient History:  Diabetes:          Yes  Pertinent History: A-Fib, Previous DVT, HTN, Dyslipidemia and PE.    Study Detail: The following Echo studies were performed: 2D, M-Mode, Doppler and  color flow. Technically challenging study due to body habitus.  Definity used as a contrast agent for endocardial border  definition. Total contrast used for this procedure was 2 mL via IV  push.      PHYSICIAN INTERPRETATION:  Left Ventricle: The left ventricular systolic function is normal, with an estimated ejection fraction of 55-60%. The left ventricular cavity size is mildly dilated. Spectral Doppler shows a pseudonormal pattern of left ventricular diastolic  filling.  Left Atrium: The left atrium is mildly dilated.  Right Ventricle: The right ventricle is normal in size. There is normal right ventricular global systolic function.  Right Atrium: The right atrium is mildly dilated.  Aortic Valve: The aortic valve is trileaflet. There is no evidence of aortic valve regurgitation. The peak instantaneous gradient of the aortic valve is 5.2 mmHg. The mean gradient of the aortic valve is 2.7 mmHg.  Mitral Valve: The mitral valve is normal in structure. There is mild mitral valve regurgitation.  Tricuspid Valve: The tricuspid valve is structurally normal. There is trace tricuspid regurgitation.  Pulmonic Valve: The pulmonic valve is structurally normal. There is trace to mild pulmonic valve regurgitation.  Pericardium: There is a trivial pericardial effusion.  Aorta: The aortic root is normal.  In comparison to the previous echocardiogram(s): Compared with study from 9/24/2021, no significant change.      CONCLUSIONS:  1. Left ventricular systolic function is normal with a 55-60% estimated ejection fraction.  2. Spectral Doppler shows a pseudonormal pattern of left ventricular diastolic filling.    QUANTITATIVE DATA SUMMARY:  2D MEASUREMENTS:  Normal Ranges:  LAs:           4.40 cm   (2.7-4.0cm)  IVSd:          1.10 cm   (0.6-1.1cm)  LVPWd:         1.10 cm   (0.6-1.1cm)  LVIDd:         5.90 cm   (3.9-5.9cm)  LVIDs:         4.60 cm  LV Mass Index: 92.2 g/m2  LV % FS        22.0 %    LA VOLUME:  Normal Ranges:  LA Vol A4C:        116.1 ml   (22+/-6mL/m2)  LA Vol A2C:        96.6 ml  LA Vol BP:         107.6 ml  LA Vol Index A4C:  39.4ml/m2  LA Vol Index A2C:  32.8 ml/m2  LA Vol Index BP:   36.5 ml/m2  LA Area A4C:       29.8 cm2  LA Area A2C:       27.6 cm2  LA Major Axis A4C: 6.5 cm  LA Major Axis A2C: 6.7 cm  LA Volume Index:   36.3 ml/m2    RA VOLUME BY A/L METHOD:  Normal Ranges:  RA Vol A4C:        62.0 ml    (8.3-19.5ml)  RA Vol Index A4C:  21.0 ml/m2  RA Area A4C:        21.1 cm2  RA Major Axis A4C: 6.1 cm    M-MODE MEASUREMENTS:  Normal Ranges:  Ao Root: 4.00 cm (2.0-3.7cm)  LAs:     5.60 cm (2.7-4.0cm)    AORTA MEASUREMENTS:  Normal Ranges:  Ao Sinus, d: 3.20 cm (2.1-3.5cm)  Ao STJ, d:   2.50 cm (1.7-3.4cm)  Asc Ao, d:   2.70 cm (2.1-3.4cm)    LV DIASTOLIC FUNCTION:  Normal Ranges:  MV Peak E:        1.14 m/s    (0.7-1.2 m/s)  MV Peak A:        0.40 m/s    (0.42-0.7 m/s)  E/A Ratio:        2.85        (1.0-2.2)  MV e'             0.08 m/s    (>8.0)  MV lateral e'     0.08 m/s  MV medial e'      0.07 m/s  MV A Dur:         115.00 msec  E/e' Ratio:       14.25       (<8.0)  a'                0.04 m/s  PulmV Sys Rambo:    64.00 cm/s  PulmV Keen Rambo:   74.00 cm/s  PulmV S/D Rambo:    0.86  PulmV A Revs Rambo: 22.00 cm/s  PulmV A Revs Dur: 120.00 msec    MITRAL VALVE:  Normal Ranges:  MV DT: 174 msec (150-240msec)    AORTIC VALVE:  Normal Ranges:  AoV Vmax:                1.14 m/s (<=1.7m/s)  AoV Peak P.2 mmHg (<20mmHg)  AoV Mean P.7 mmHg (1.7-11.5mmHg)  LVOT Max Rambo:            1.09 m/s (<=1.1m/s)  AoV VTI:                 20.90 cm (18-25cm)  LVOT VTI:                22.10 cm  LVOT Diameter:           2.50 cm  (1.8-2.4cm)  AoV Area, VTI:           5.19 cm2 (2.5-5.5cm2)  AoV Area,Vmax:           4.69 cm2 (2.5-4.5cm2)  AoV Dimensionless Index: 1.06      RIGHT VENTRICLE:  TAPSE: 23.0 mm  RV s'  0.17 m/s    TRICUSPID VALVE/RVSP:  Normal Ranges:  TV E Vmax: 0.45 m/s (0.3-0.7m/s)  TV A Vmax: 0.55 m/s  IVC Diam:  1.40 cm    PULMONIC VALVE:  Normal Ranges:  PV Max Rambo: 0.9 m/s  (0.6-0.9m/s)  PV Max PG:  3.3 mmHg  PIEDV:      1.21 m/s  PADP:       8.9 mmHg    Pulmonary Veins:  PulmV A Revs Dur: 120.00 msec  PulmV A Revs Rambo: 22.00 cm/s  PulmV Keen Rambo:   74.00 cm/s  PulmV S/D Rambo:    0.86  PulmV Sys Rambo:    64.00 cm/s      [id number] Bishnu Evans MD  Electronically signed on 2023 at 4:23:02 PM        ** Final **       Cath:      Stress Test:  Stress Results:  No  results found for this or any previous visit from the past 365 days.         Cardiac Imaging:  CT abdomen pelvis July 18, 2024  VESSELS:  Mildatherosclerotic changes are noted involving the aorta and  branching vessels. No aortic aneurysm. IVC appears normal in caliber.      Assessment/Plan   Diagnoses and all orders for this visit:  Persistent atrial fibrillation (Multi)  -     Comprehensive Metabolic Panel; Future  -     CBC; Future  Dyslipidemia  -     ECG 12 lead (Clinic Performed)  -     Lipid Panel; Future  Type 2 diabetes mellitus without complication, without long-term current use of insulin (Multi)  -     Referral to [facility]; Future    In summary Mr. 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  He is asymptomatic from a cardiac perspective.  His blood pressure is controlled.  I ordered labs as indicated below.  [facility] and diabetes management.  He should continue his current cardiovascular medications.  I ordered labs as indicated below.  We will see him back in follow-up in 6 months.      Orders:  Orders Placed This Encounter   Procedures    Lipid Panel    Comprehensive Metabolic Panel    CBC    Referral to [facility]    ECG 12 lead (Clinic Performed)      Followup Appts:  Future Appointments   Date Time Provider Department Center   8/15/2024 10:30 AM Efrain Wallace MD [id number] [facility]   2/5/2025 11:00 AM Luz Yoon, APRN-CNP [id number] [facility]           ____________________________________________________________  Bishnu Evans MD  [facility]  [facility]

## 2024-08-07 LAB
ATRIAL RATE: 68 BPM
P AXIS: 76 DEGREES
P OFFSET: 185 MS
P ONSET: 132 MS
PR INTERVAL: 154 MS
Q ONSET: 209 MS
QRS COUNT: 12 BEATS
QRS DURATION: 84 MS
QT INTERVAL: 422 MS
QTC CALCULATION(BAZETT): 448 MS
QTC FREDERICIA: 440 MS
R AXIS: 9 DEGREES
T AXIS: 64 DEGREES
T OFFSET: 420 MS
VENTRICULAR RATE: 68 BPM

## 2024-08-07 PROCEDURE — 93005 ELECTROCARDIOGRAM TRACING: CPT

## 2024-08-26 ENCOUNTER — OFFICE VISIT (OUTPATIENT)
Dept: ORTHOPEDIC SURGERY | Facility: CLINIC | Age: 64
End: 2024-08-26
Payer: COMMERCIAL

## 2024-08-26 VITALS — WEIGHT: 315 LBS | BODY MASS INDEX: 50.07 KG/M2

## 2024-08-26 DIAGNOSIS — M25.561 CHRONIC PAIN OF RIGHT KNEE: ICD-10-CM

## 2024-08-26 DIAGNOSIS — M17.11 PRIMARY OSTEOARTHRITIS OF RIGHT KNEE: Primary | ICD-10-CM

## 2024-08-26 DIAGNOSIS — G89.29 CHRONIC PAIN OF RIGHT KNEE: ICD-10-CM

## 2024-08-26 PROCEDURE — 99204 OFFICE O/P NEW MOD 45 MIN: CPT | Performed by: ORTHOPAEDIC SURGERY

## 2024-08-26 PROCEDURE — 2500000005 HC RX 250 GENERAL PHARMACY W/O HCPCS: Performed by: ORTHOPAEDIC SURGERY

## 2024-08-26 PROCEDURE — 20610 DRAIN/INJ JOINT/BURSA W/O US: CPT | Mod: RT | Performed by: ORTHOPAEDIC SURGERY

## 2024-08-26 PROCEDURE — 2500000004 HC RX 250 GENERAL PHARMACY W/ HCPCS (ALT 636 FOR OP/ED): Performed by: ORTHOPAEDIC SURGERY

## 2024-08-26 PROCEDURE — 3061F NEG MICROALBUMINURIA REV: CPT | Performed by: ORTHOPAEDIC SURGERY

## 2024-08-26 PROCEDURE — 99214 OFFICE O/P EST MOD 30 MIN: CPT | Performed by: ORTHOPAEDIC SURGERY

## 2024-08-26 PROCEDURE — 3052F HG A1C>EQUAL 8.0%<EQUAL 9.0%: CPT | Performed by: ORTHOPAEDIC SURGERY

## 2024-08-26 NOTE — PROGRESS NOTES
Chronic right knee pain the became acutely painful after a slip 2 weeks ago he does use a cane he also has arthritic hips  He did get stem cell injections of the right knee sometime ago which he said was somewhat helpful  Past medical,family and social histories have been reviewed and are up to date.  All other body systems have been reviewed and are negative for complaint.            Morbidly obese  Right knee: No varus or valgus malalignment  Slight flexion fashion  Flexion about 100 degrees  Generalized tenderness no effusion  X-rays show advanced tricompartment arthritis impression advanced tricompartment knee arthritis, obesity  Discussed natural history  Discussed indication for knee replacement discussed importance of weight loss regarding minimizing complications with joint placement surgery  Injected knee today  L Inj/Asp: R knee on 8/27/2024 11:27 AM  Indications: pain and joint swelling  Details: 21 G needle, anterolateral approach  Medications: 40 mg methylPREDNISolone acetate 40 mg/mL; 2 mL lidocaine 20 mg/mL (2 %)  Outcome: tolerated well, no immediate complications  Procedure, treatment alternatives, risks and benefits explained, specific risks discussed. Consent was given by the patient. Immediately prior to procedure a time out was called to verify the correct patient, procedure, equipment, support staff and site/side marked as required. Patient was prepped and draped in the usual sterile fashion.

## 2024-08-27 PROCEDURE — 20610 DRAIN/INJ JOINT/BURSA W/O US: CPT | Mod: RT | Performed by: ORTHOPAEDIC SURGERY

## 2024-08-27 RX ORDER — METHYLPREDNISOLONE ACETATE 40 MG/ML
40 INJECTION, SUSPENSION INTRA-ARTICULAR; INTRALESIONAL; INTRAMUSCULAR; SOFT TISSUE
Status: COMPLETED | OUTPATIENT
Start: 2024-08-27 | End: 2024-08-27

## 2024-08-27 RX ORDER — LIDOCAINE HYDROCHLORIDE 20 MG/ML
2 INJECTION, SOLUTION INFILTRATION; PERINEURAL
Status: COMPLETED | OUTPATIENT
Start: 2024-08-27 | End: 2024-08-27

## 2024-08-30 ENCOUNTER — HOSPITAL ENCOUNTER (OUTPATIENT)
Dept: RADIOLOGY | Facility: HOSPITAL | Age: 64
Discharge: HOME | End: 2024-08-30
Payer: COMMERCIAL

## 2024-08-30 DIAGNOSIS — N20.0 CALCULUS OF KIDNEY: ICD-10-CM

## 2024-08-30 PROCEDURE — 74018 RADEX ABDOMEN 1 VIEW: CPT

## 2024-10-14 ENCOUNTER — OFFICE VISIT (OUTPATIENT)
Dept: ORTHOPEDIC SURGERY | Facility: CLINIC | Age: 64
End: 2024-10-14
Payer: COMMERCIAL

## 2024-10-14 DIAGNOSIS — M17.0 PRIMARY OSTEOARTHRITIS OF BOTH KNEES: Primary | ICD-10-CM

## 2024-10-14 PROCEDURE — 99213 OFFICE O/P EST LOW 20 MIN: CPT | Performed by: ORTHOPAEDIC SURGERY

## 2024-10-14 PROCEDURE — 3061F NEG MICROALBUMINURIA REV: CPT | Performed by: ORTHOPAEDIC SURGERY

## 2024-10-14 PROCEDURE — 3052F HG A1C>EQUAL 8.0%<EQUAL 9.0%: CPT | Performed by: ORTHOPAEDIC SURGERY

## 2024-10-14 ASSESSMENT — PAIN - FUNCTIONAL ASSESSMENT: PAIN_FUNCTIONAL_ASSESSMENT: 0-10

## 2024-10-14 ASSESSMENT — PAIN SCALES - GENERAL: PAINLEVEL_OUTOF10: 7

## 2024-10-14 NOTE — PROGRESS NOTES
Bilateral knee arthritis cortisone injections have not been helpful he has pain at rest pain with activity  Wants to try viscosupplementation      Past medical,family and social histories have been reviewed and are up to date.  All other body systems have been reviewed and are negative for complaint.  Morbidly obese  Both knees 5 to 120 degrees crepitant generalized tenderness  X-rays advanced tricompartment arthritis both knees    Plan weight loss discussed indications for knee replacement surgery currently is not a candidate because of his elevated BMI  Apply for approval for viscosupplementation

## 2024-10-22 ENCOUNTER — TELEPHONE (OUTPATIENT)
Dept: ORTHOPEDIC SURGERY | Facility: CLINIC | Age: 64
End: 2024-10-22
Payer: COMMERCIAL

## 2024-10-22 DIAGNOSIS — M17.0 PRIMARY OSTEOARTHRITIS OF BOTH KNEES: Primary | ICD-10-CM

## 2024-10-22 RX ORDER — HYALURONATE SODIUM 16.8MG/2ML
16.8 SYRINGE (ML) INTRAARTICULAR
Qty: 12 ML | Refills: 0 | Status: SHIPPED | OUTPATIENT
Start: 2024-10-22

## 2024-10-22 NOTE — TELEPHONE ENCOUNTER
Please send Gelsyn-3 to [facility]. (NAZARIO KNEE)

## 2024-10-22 NOTE — TELEPHONE ENCOUNTER
Prior auth submitted via phone today PA not required for [employer]. Call ref # [id number]

## 2025-01-08 NOTE — ED TRIAGE NOTES
History & Physical      PCP: No primary care provider on file.    Date of Admission: 1/7/2025    Chief Complaint:  had concerns including Fatigue, Vomiting (Nausea since this AM. Emesis x 1. ), Headache (Since this AM - took aleve around 1800), and Back Pain (Middle low back pain- reports tingling in legs at times.).      ASSESSMENT:    Aidan Grider is a 58 y.o. male patient of No primary care provider on file. with history of Diabetes mellitus (HCC) and Hyperlipidemia. presented to [facility] on 1/7/2025 with nausea, vomiting and headaches.    Assessment:  DKA secondary to medical noncompliance out of Lantus for 3 weeks  High anion gap metabolic acidosis  Pseudohyponatremia  Leukocytosis secondary to stress-induced      Plan:  Currently on room air, keep SpO2 goal above 92, As needed bronchodilator  Check proBNP, troponin, RVP/COVID-19 negative  No signs of infection  DKA, insulin per protocol,BMP, mag, Phos every 4, Transition to Lantus once anion gap below 12 x 2, Consult to endocrinology, Beta hydroxybutyrate greater than 4.5, ketones positive-Lactic acid    Diet: Diet NPO  Code Status: Full  DVT Prophylaxis: Lovenox  Disposition: []Med/Surg [] Intermediate [x] ICU/CCU  Admit status: [] Observation [x] Inpatient     Charlie Ortiz MD    History Of Present Illness:    The patient is a 58 y.o. male with significant past medical history of pancreatitis x 2, diabetes, hyperlipidemia, tobacco abuse, presented to ED in [place] on 1/7/24 with headache and nausea and vomiting and back pain.  Patient stated the symptoms started earlier yesterday morning.     Patient also complains of bilateral lower legs tingling.  Patient denies any fever, chills, dizziness, neck tenderness, or stiffness, chest pain, shortness of breath, or any cough.      Upon ED workup, patient was negative for COVID and influenza, UA was positive for ketones, lab works were sent that were hemolyzed therefore repeat labs  Pt c/o right flank pain, back pain and hematuria.   opacification of the right maxillary sinus consistent with sinusitis this is worsened from the prior study.  Thickening of the bony margin of the right maxillary sinus suggests chronic sinusitis. Mastoid air cells:  Unremarkable as visualized.  No mastoid effusion.     1.  Near-total opacification of the right maxillary sinus consistent with sinusitis this is worsened from the prior study.  Thickening of the bony margin of the right maxillary sinus suggests chronic sinusitis. 2.  No acute intracranial abnormality noted.         Reviewed Labs, EKG and Imaging personally    +++++++++++++++++++++++++++++++++++++++++++++++++  Charlie Ortiz MD    [place], [place]  +++++++++++++++++++++++++++++++++++++++++++++++++  NOTE: This report was transcribed using voice recognition software. Every effort was made to ensure accuracy; however, inadvertent computerized transcription errors may be present.

## 2025-01-21 ENCOUNTER — TELEPHONE (OUTPATIENT)
Dept: ORTHOPEDIC SURGERY | Facility: CLINIC | Age: 65
End: 2025-01-21
Payer: COMMERCIAL

## 2025-01-21 NOTE — TELEPHONE ENCOUNTER
[facility]he[facility] called in and stated that they have been trying to reach out to this patient about his gel injections. That they were approved so the patient can schedule his gel shots.

## 2025-01-31 ENCOUNTER — OFFICE VISIT (OUTPATIENT)
Dept: ORTHOPEDIC SURGERY | Facility: CLINIC | Age: 65
End: 2025-01-31
Payer: COMMERCIAL

## 2025-01-31 DIAGNOSIS — M17.0 PRIMARY OSTEOARTHRITIS OF BOTH KNEES: Primary | ICD-10-CM

## 2025-01-31 NOTE — PROGRESS NOTES
L Inj/Asp: bilateral knee on 1/31/2025 11:24 AM  Indications: pain  Details: 21 G needle, lateral approach  Medications (Right): 16.8 mg sodium hyaluronate 16.8 mg/2 mL  Medications (Left): 16.8 mg sodium hyaluronate 16.8 mg/2 mL

## 2025-02-07 ENCOUNTER — APPOINTMENT (OUTPATIENT)
Dept: ORTHOPEDIC SURGERY | Facility: CLINIC | Age: 65
End: 2025-02-07
Payer: COMMERCIAL

## 2025-02-11 ENCOUNTER — APPOINTMENT (OUTPATIENT)
Dept: ORTHOPEDIC SURGERY | Facility: CLINIC | Age: 65
End: 2025-02-11
Payer: COMMERCIAL

## 2025-02-11 DIAGNOSIS — M17.0 PRIMARY OSTEOARTHRITIS OF BOTH KNEES: Primary | ICD-10-CM

## 2025-02-11 PROCEDURE — 20610 DRAIN/INJ JOINT/BURSA W/O US: CPT | Performed by: ORTHOPAEDIC SURGERY

## 2025-02-11 PROCEDURE — 1036F TOBACCO NON-USER: CPT | Performed by: ORTHOPAEDIC SURGERY

## 2025-02-11 NOTE — PROGRESS NOTES
L Inj/Asp: bilateral knee on 2/11/2025 1:11 PM  Indications: pain  Details: 21 G needle, lateral approach  Medications (Right): 16.8 mg sodium hyaluronate 16.8 mg/2 mL  Medications (Left): 16.8 mg sodium hyaluronate 16.8 mg/2 mL

## 2025-02-14 ENCOUNTER — APPOINTMENT (OUTPATIENT)
Dept: ORTHOPEDIC SURGERY | Facility: CLINIC | Age: 65
End: 2025-02-14
Payer: COMMERCIAL

## 2025-02-18 ENCOUNTER — APPOINTMENT (OUTPATIENT)
Dept: ORTHOPEDIC SURGERY | Facility: CLINIC | Age: 65
End: 2025-02-18
Payer: COMMERCIAL

## 2025-02-18 DIAGNOSIS — M17.0 PRIMARY OSTEOARTHRITIS OF BOTH KNEES: Primary | ICD-10-CM

## 2025-02-18 PROCEDURE — 20610 DRAIN/INJ JOINT/BURSA W/O US: CPT | Performed by: ORTHOPAEDIC SURGERY

## 2025-02-18 PROCEDURE — 1036F TOBACCO NON-USER: CPT | Performed by: ORTHOPAEDIC SURGERY

## 2025-02-18 NOTE — PROGRESS NOTES
L Inj/Asp: bilateral knee on 2/18/2025 11:11 AM  Details: 21 G needle, anteromedial approach  Medications (Right): 16.8 mg sodium hyaluronate 16.8 mg/2 mL  Medications (Left): 16.8 mg sodium hyaluronate 16.8 mg/2 mL

## 2025-05-02 ENCOUNTER — APPOINTMENT (OUTPATIENT)
Dept: PRIMARY CARE | Facility: CLINIC | Age: 65
End: 2025-05-02
Payer: COMMERCIAL

## 2025-05-02 VITALS
DIASTOLIC BLOOD PRESSURE: 73 MMHG | HEART RATE: 84 BPM | OXYGEN SATURATION: 98 % | WEIGHT: 315 LBS | RESPIRATION RATE: 18 BRPM | BODY MASS INDEX: 37.19 KG/M2 | SYSTOLIC BLOOD PRESSURE: 130 MMHG | HEIGHT: 77 IN

## 2025-05-02 DIAGNOSIS — I48.11 LONGSTANDING PERSISTENT ATRIAL FIBRILLATION (MULTI): ICD-10-CM

## 2025-05-02 DIAGNOSIS — Z00.00 ANNUAL PHYSICAL EXAM: ICD-10-CM

## 2025-05-02 DIAGNOSIS — I47.19 ATRIAL TACHYCARDIA (CMS-HCC): ICD-10-CM

## 2025-05-02 DIAGNOSIS — I48.20 CHRONIC ATRIAL FIBRILLATION (MULTI): ICD-10-CM

## 2025-05-02 DIAGNOSIS — Z12.5 SCREENING FOR PROSTATE CANCER: ICD-10-CM

## 2025-05-02 DIAGNOSIS — I48.3 TYPICAL ATRIAL FLUTTER (MULTI): ICD-10-CM

## 2025-05-02 DIAGNOSIS — Z13.89 SCREENING FOR OBESITY: ICD-10-CM

## 2025-05-02 DIAGNOSIS — I48.19 PERSISTENT ATRIAL FIBRILLATION (MULTI): ICD-10-CM

## 2025-05-02 DIAGNOSIS — E78.5 HYPERLIPIDEMIA, UNSPECIFIED HYPERLIPIDEMIA TYPE: ICD-10-CM

## 2025-05-02 DIAGNOSIS — I48.21 PERMANENT ATRIAL FIBRILLATION (MULTI): ICD-10-CM

## 2025-05-02 DIAGNOSIS — Z13.29 SCREENING FOR THYROID DISORDER: ICD-10-CM

## 2025-05-02 DIAGNOSIS — Z00.00 PHYSICAL EXAM: ICD-10-CM

## 2025-05-02 DIAGNOSIS — D12.6 ADENOMATOUS POLYP OF COLON, UNSPECIFIED PART OF COLON: ICD-10-CM

## 2025-05-02 DIAGNOSIS — E66.813 CLASS 3 SEVERE OBESITY DUE TO EXCESS CALORIES WITH SERIOUS COMORBIDITY AND BODY MASS INDEX (BMI) OF 45.0 TO 49.9 IN ADULT: ICD-10-CM

## 2025-05-02 DIAGNOSIS — I48.0 PAROXYSMAL ATRIAL FIBRILLATION (MULTI): Primary | Chronic | ICD-10-CM

## 2025-05-02 DIAGNOSIS — E11.40 TYPE 2 DIABETES MELLITUS WITH DIABETIC NEUROPATHY, WITHOUT LONG-TERM CURRENT USE OF INSULIN: ICD-10-CM

## 2025-05-02 PROCEDURE — 1036F TOBACCO NON-USER: CPT | Performed by: INTERNAL MEDICINE

## 2025-05-02 PROCEDURE — 3008F BODY MASS INDEX DOCD: CPT | Performed by: INTERNAL MEDICINE

## 2025-05-02 PROCEDURE — 3078F DIAST BP <80 MM HG: CPT | Performed by: INTERNAL MEDICINE

## 2025-05-02 PROCEDURE — 3075F SYST BP GE 130 - 139MM HG: CPT | Performed by: INTERNAL MEDICINE

## 2025-05-02 PROCEDURE — 99396 PREV VISIT EST AGE 40-64: CPT | Performed by: INTERNAL MEDICINE

## 2025-05-02 ASSESSMENT — ENCOUNTER SYMPTOMS
OCCASIONAL FEELINGS OF UNSTEADINESS: 0
CONSTITUTIONAL NEGATIVE: 1
LOSS OF SENSATION IN FEET: 0
DEPRESSION: 0

## 2025-05-02 NOTE — PROGRESS NOTES
"Patient ID: Mitch Connor is a 64 y.o. male who presents for Annual Exam.    /73 (BP Location: Left arm, Patient Position: Sitting, BP Cuff Size: Large adult)   Pulse 84   Resp 18   Ht 1.956 m (6' 5\")   Wt (!) 175 kg (385 lb 12.8 oz)   SpO2 98%   BMI 45.75 kg/m²     HPI        OA BOTH KNEES   PT SEE ORTHOPEDIC   FOR INTRAARTICULAR INJECTIONS BOTH KNEES         BPH WITH INTERMITTENT URINARY FREQUENCY         ATRIAL FIBRILLATION   ON XARELTO 20MG A DAY      S/P CATHETER ABLATION         HX OF PULM EMBOLISM      HX OF DVT         MORBIDLY OBESE         OBSTRUCTIVE SLEEP APNEA         DIABETES WITH NEUROPATHY STABLE      HE IS MORBIDLY OBESE   HE IS NOT SURE IF HE HAS OBSTRUCTIVE SLEEP APNEA  PT DECIDED NOT TO USE CPAP   EVEN HE HAS SLEEP APNEA AND KNOWING THE RISK   IT CAN CAUSE WITH ATRIAL FIBRILLATION AND POTENTIAL  RISK FOR STROKE        Subjective     Review of Systems   Constitutional: Negative.    All other systems reviewed and are negative.      Objective     Physical Exam  Vitals and nursing note reviewed.   Constitutional:       Appearance: Normal appearance. He is obese.   Neck:      Vascular: No carotid bruit.   Cardiovascular:      Rate and Rhythm: Normal rate. Rhythm irregular.      Pulses: Normal pulses.      Heart sounds: Normal heart sounds.   Pulmonary:      Effort: Pulmonary effort is normal.      Breath sounds: Normal breath sounds.   Abdominal:      Palpations: There is mass.   Musculoskeletal:      Right lower leg: No edema.      Left lower leg: No edema.   Skin:     Capillary Refill: Capillary refill takes more than 3 seconds.   Neurological:      General: No focal deficit present.      Mental Status: He is oriented to person, place, and time. Mental status is at baseline.   Psychiatric:         Mood and Affect: Mood normal.         Behavior: Behavior normal.         Thought Content: Thought content normal.         Judgment: Judgment normal.         Lab Results   Component Value Date "    WBC 8.1 12/09/2023    HGB 13.0 (L) 12/09/2023    HCT 40.4 (L) 12/09/2023    MCV 84 12/09/2023     12/09/2023           Problem List Items Addressed This Visit       Permanent atrial fibrillation (Multi) - Primary    stable         Chronic atrial fibrillation (Multi)    stable         Diabetes mellitus (Multi)    stable         Relevant Orders    Comprehensive metabolic panel    Hemoglobin A1c    Albumin-Creatinine Ratio, Urine Random    Annual physical exam    Relevant Orders    Comprehensive metabolic panel    Lipid panel    Persistent atrial fibrillation (Multi)    stable         Physical exam    Class 3 severe obesity due to excess calories with serious comorbidity and body mass index (BMI) of 45.0 to 49.9 in adult    Screening for obesity     Other Visit Diagnoses         Hyperlipidemia, unspecified hyperlipidemia type        Relevant Orders    Lipid panel      Screening for prostate cancer        Relevant Orders    Prostate Spec.Ag,Screen      Screening for thyroid disorder        Relevant Orders    Tsh With Reflex To Free T4 If Abnormal      Adenomatous polyp of colon, unspecified part of colon        Relevant Orders    Colonoscopy Screening; Average Risk Patient                 A/P        CMP, LIPID, A1C URINE MICROALBUMIN  METOPROLOL TARTRATE 50MG 1 TAB BGID FILLEDE  XARELTO 20MG 1 TABLET EVERY DAY  ATORVASTATIN 20MG 1 TAB EEVERY DAY   COLONOSCOPY REFFERAL         Advance Care Planning Note     Discussion Date: 05/02/25   Discussion Participants: patient    The patient wishes to discuss Advance Care Planning today and the following is a brief summary of our discussion.     Patient has capacity to make their own medical decisions: Yes  Health Care Agent/Surrogate Decision Maker documented in chart: Yes    Documents on file and valid:  Advance Directive/Living Will: No   Health Care Power of : Yes  Other:     Communication of Medical Status/Prognosis:        Communication of Treatment  Goals/Options:       DISCUSSED WITH THE PT END OF LIFE CHOICES , PT IS FULL CODE   HE DOES NOT HAVE THE LIVING WILL OR POA , HE WILL THINK ABOUT GETTING IT DONE AND WHEN READY WITH BRING IT ON NEXT AVISIT SO THIS CAN BE SCANNED INTO THE CHART FOR THE PURPOSE OF DOCUMENTATION      Treatment Decisions  Goals of Care: survival is paramount regardless of prognosis, treatment outcome, or burden       Follow Up Plan      Team Members    Time Statement: Total face to face time spent on advance care planning was 5 minutes with 5 minutes spent in counseling, including the explanation.    Arthur Bond MD  5/2/2025 12:46 PM

## 2025-05-04 DIAGNOSIS — E11.69 TYPE 2 DIABETES MELLITUS WITH OTHER SPECIFIED COMPLICATION, WITHOUT LONG-TERM CURRENT USE OF INSULIN: Primary | ICD-10-CM

## 2025-05-04 LAB
ALBUMIN SERPL-MCNC: 3.7 G/DL (ref 3.6–5.1)
ALBUMIN/CREAT UR: 14 MG/G CREAT
ALP SERPL-CCNC: 65 U/L (ref 35–144)
ALT SERPL-CCNC: 9 U/L (ref 9–46)
ANION GAP SERPL CALCULATED.4IONS-SCNC: 8 MMOL/L (CALC) (ref 7–17)
AST SERPL-CCNC: 11 U/L (ref 10–35)
BILIRUB SERPL-MCNC: 0.9 MG/DL (ref 0.2–1.2)
BUN SERPL-MCNC: 12 MG/DL (ref 7–25)
CALCIUM SERPL-MCNC: 8.8 MG/DL (ref 8.6–10.3)
CHLORIDE SERPL-SCNC: 106 MMOL/L (ref 98–110)
CHOLEST SERPL-MCNC: 101 MG/DL
CHOLEST/HDLC SERPL: 2.7 (CALC)
CO2 SERPL-SCNC: 24 MMOL/L (ref 20–32)
CREAT SERPL-MCNC: 0.97 MG/DL (ref 0.7–1.35)
CREAT UR-MCNC: 130 MG/DL (ref 20–320)
EGFRCR SERPLBLD CKD-EPI 2021: 88 ML/MIN/1.73M2
EST. AVERAGE GLUCOSE BLD GHB EST-MCNC: 183 MG/DL
EST. AVERAGE GLUCOSE BLD GHB EST-SCNC: 10.1 MMOL/L
GLUCOSE SERPL-MCNC: 179 MG/DL (ref 65–99)
HBA1C MFR BLD: 8 %
HDLC SERPL-MCNC: 38 MG/DL
LDLC SERPL CALC-MCNC: 50 MG/DL (CALC)
MICROALBUMIN UR-MCNC: 1.8 MG/DL
NONHDLC SERPL-MCNC: 63 MG/DL (CALC)
POTASSIUM SERPL-SCNC: 4.2 MMOL/L (ref 3.5–5.3)
PROT SERPL-MCNC: 6.2 G/DL (ref 6.1–8.1)
PSA SERPL-MCNC: 1.9 NG/ML
SODIUM SERPL-SCNC: 138 MMOL/L (ref 135–146)
TRIGL SERPL-MCNC: 54 MG/DL
TSH SERPL-ACNC: 0.88 MIU/L (ref 0.4–4.5)

## 2025-05-04 RX ORDER — GLIPIZIDE 5 MG/1
5 TABLET ORAL
Qty: 180 TABLET | Refills: 1 | Status: SHIPPED | OUTPATIENT
Start: 2025-05-04

## 2025-05-06 DIAGNOSIS — E78.5 HYPERLIPIDEMIA, UNSPECIFIED HYPERLIPIDEMIA TYPE: ICD-10-CM

## 2025-05-07 RX ORDER — ATORVASTATIN CALCIUM 40 MG/1
40 TABLET, FILM COATED ORAL DAILY
Qty: 90 TABLET | Refills: 3 | Status: SHIPPED | OUTPATIENT
Start: 2025-05-07

## 2025-06-19 ENCOUNTER — HOSPITAL ENCOUNTER (EMERGENCY)
Facility: HOSPITAL | Age: 65
Discharge: HOME | End: 2025-06-19
Attending: GENERAL PRACTICE
Payer: COMMERCIAL

## 2025-06-19 ENCOUNTER — APPOINTMENT (OUTPATIENT)
Dept: RADIOLOGY | Facility: HOSPITAL | Age: 65
End: 2025-06-19
Payer: COMMERCIAL

## 2025-06-19 VITALS
TEMPERATURE: 96.3 F | RESPIRATION RATE: 16 BRPM | HEIGHT: 74 IN | DIASTOLIC BLOOD PRESSURE: 77 MMHG | SYSTOLIC BLOOD PRESSURE: 157 MMHG | BODY MASS INDEX: 40.43 KG/M2 | WEIGHT: 315 LBS | HEART RATE: 82 BPM | OXYGEN SATURATION: 99 %

## 2025-06-19 DIAGNOSIS — N20.1 URETERAL CALCULUS: Primary | ICD-10-CM

## 2025-06-19 LAB
ALBUMIN SERPL BCP-MCNC: 3.9 G/DL (ref 3.4–5)
ALP SERPL-CCNC: 69 U/L (ref 33–136)
ALT SERPL W P-5'-P-CCNC: 11 U/L (ref 10–52)
ANION GAP SERPL CALC-SCNC: 12 MMOL/L (ref 10–20)
APPEARANCE UR: CLEAR
AST SERPL W P-5'-P-CCNC: 11 U/L (ref 9–39)
BASOPHILS # BLD AUTO: 0.06 X10*3/UL (ref 0–0.1)
BASOPHILS NFR BLD AUTO: 0.8 %
BILIRUB SERPL-MCNC: 1.2 MG/DL (ref 0–1.2)
BILIRUB UR STRIP.AUTO-MCNC: NEGATIVE MG/DL
BUN SERPL-MCNC: 17 MG/DL (ref 6–23)
CALCIUM SERPL-MCNC: 9.1 MG/DL (ref 8.6–10.3)
CHLORIDE SERPL-SCNC: 104 MMOL/L (ref 98–107)
CO2 SERPL-SCNC: 26 MMOL/L (ref 21–32)
COLOR UR: ABNORMAL
CREAT SERPL-MCNC: 1.07 MG/DL (ref 0.5–1.3)
EGFRCR SERPLBLD CKD-EPI 2021: 78 ML/MIN/1.73M*2
EOSINOPHIL # BLD AUTO: 0.15 X10*3/UL (ref 0–0.7)
EOSINOPHIL NFR BLD AUTO: 1.9 %
ERYTHROCYTE [DISTWIDTH] IN BLOOD BY AUTOMATED COUNT: 12.9 % (ref 11.5–14.5)
GLUCOSE SERPL-MCNC: 146 MG/DL (ref 74–99)
GLUCOSE UR STRIP.AUTO-MCNC: ABNORMAL MG/DL
HCT VFR BLD AUTO: 45.1 % (ref 41–52)
HGB BLD-MCNC: 14.1 G/DL (ref 13.5–17.5)
IMM GRANULOCYTES # BLD AUTO: 0.02 X10*3/UL (ref 0–0.7)
IMM GRANULOCYTES NFR BLD AUTO: 0.3 % (ref 0–0.9)
KETONES UR STRIP.AUTO-MCNC: NEGATIVE MG/DL
LACTATE SERPL-SCNC: 1.5 MMOL/L (ref 0.4–2)
LEUKOCYTE ESTERASE UR QL STRIP.AUTO: NEGATIVE
LIPASE SERPL-CCNC: 36 U/L (ref 9–82)
LYMPHOCYTES # BLD AUTO: 2.29 X10*3/UL (ref 1.2–4.8)
LYMPHOCYTES NFR BLD AUTO: 28.9 %
MCH RBC QN AUTO: 26.6 PG (ref 26–34)
MCHC RBC AUTO-ENTMCNC: 31.3 G/DL (ref 32–36)
MCV RBC AUTO: 85 FL (ref 80–100)
MONOCYTES # BLD AUTO: 0.71 X10*3/UL (ref 0.1–1)
MONOCYTES NFR BLD AUTO: 9 %
MUCOUS THREADS #/AREA URNS AUTO: ABNORMAL /LPF
NEUTROPHILS # BLD AUTO: 4.7 X10*3/UL (ref 1.2–7.7)
NEUTROPHILS NFR BLD AUTO: 59.1 %
NITRITE UR QL STRIP.AUTO: NEGATIVE
NRBC BLD-RTO: 0 /100 WBCS (ref 0–0)
PH UR STRIP.AUTO: 5.5 [PH]
PLATELET # BLD AUTO: 190 X10*3/UL (ref 150–450)
POTASSIUM SERPL-SCNC: 4.1 MMOL/L (ref 3.5–5.3)
PROT SERPL-MCNC: 6.7 G/DL (ref 6.4–8.2)
PROT UR STRIP.AUTO-MCNC: NEGATIVE MG/DL
RBC # BLD AUTO: 5.31 X10*6/UL (ref 4.5–5.9)
RBC # UR STRIP.AUTO: ABNORMAL MG/DL
RBC #/AREA URNS AUTO: ABNORMAL /HPF
SODIUM SERPL-SCNC: 138 MMOL/L (ref 136–145)
SP GR UR STRIP.AUTO: >1.05
UROBILINOGEN UR STRIP.AUTO-MCNC: NORMAL MG/DL
WBC # BLD AUTO: 7.9 X10*3/UL (ref 4.4–11.3)
WBC #/AREA URNS AUTO: ABNORMAL /HPF

## 2025-06-19 PROCEDURE — 74177 CT ABD & PELVIS W/CONTRAST: CPT

## 2025-06-19 PROCEDURE — 80053 COMPREHEN METABOLIC PANEL: CPT | Performed by: GENERAL PRACTICE

## 2025-06-19 PROCEDURE — 74177 CT ABD & PELVIS W/CONTRAST: CPT | Performed by: RADIOLOGY

## 2025-06-19 PROCEDURE — 83605 ASSAY OF LACTIC ACID: CPT | Performed by: GENERAL PRACTICE

## 2025-06-19 PROCEDURE — 81001 URINALYSIS AUTO W/SCOPE: CPT | Performed by: GENERAL PRACTICE

## 2025-06-19 PROCEDURE — 99222 1ST HOSP IP/OBS MODERATE 55: CPT | Performed by: NURSE PRACTITIONER

## 2025-06-19 PROCEDURE — 99285 EMERGENCY DEPT VISIT HI MDM: CPT | Mod: 25 | Performed by: GENERAL PRACTICE

## 2025-06-19 PROCEDURE — 83690 ASSAY OF LIPASE: CPT | Performed by: GENERAL PRACTICE

## 2025-06-19 PROCEDURE — 85025 COMPLETE CBC W/AUTO DIFF WBC: CPT | Performed by: GENERAL PRACTICE

## 2025-06-19 PROCEDURE — 2550000001 HC RX 255 CONTRASTS: Performed by: GENERAL PRACTICE

## 2025-06-19 PROCEDURE — 36415 COLL VENOUS BLD VENIPUNCTURE: CPT | Performed by: GENERAL PRACTICE

## 2025-06-19 PROCEDURE — 2500000002 HC RX 250 W HCPCS SELF ADMINISTERED DRUGS (ALT 637 FOR MEDICARE OP, ALT 636 FOR OP/ED): Performed by: GENERAL PRACTICE

## 2025-06-19 RX ORDER — KETOROLAC TROMETHAMINE 10 MG/1
10 TABLET, FILM COATED ORAL EVERY 8 HOURS PRN
Qty: 15 TABLET | Refills: 0 | Status: SHIPPED | OUTPATIENT
Start: 2025-06-19 | End: 2025-06-24

## 2025-06-19 RX ORDER — TAMSULOSIN HYDROCHLORIDE 0.4 MG/1
0.4 CAPSULE ORAL DAILY
Qty: 7 CAPSULE | Refills: 0 | Status: SHIPPED | OUTPATIENT
Start: 2025-06-19 | End: 2025-06-26

## 2025-06-19 RX ORDER — TAMSULOSIN HYDROCHLORIDE 0.4 MG/1
0.4 CAPSULE ORAL ONCE
Status: COMPLETED | OUTPATIENT
Start: 2025-06-19 | End: 2025-06-19

## 2025-06-19 RX ADMIN — TAMSULOSIN HYDROCHLORIDE 0.4 MG: 0.4 CAPSULE ORAL at 22:59

## 2025-06-19 RX ADMIN — IOHEXOL 75 ML: 350 INJECTION, SOLUTION INTRAVENOUS at 20:08

## 2025-06-19 ASSESSMENT — PAIN DESCRIPTION - ORIENTATION: ORIENTATION: RIGHT;LOWER

## 2025-06-19 ASSESSMENT — PAIN SCALES - GENERAL
PAINLEVEL_OUTOF10: 9
PAINLEVEL_OUTOF10: 9

## 2025-06-19 ASSESSMENT — PAIN - FUNCTIONAL ASSESSMENT: PAIN_FUNCTIONAL_ASSESSMENT: 0-10

## 2025-06-19 ASSESSMENT — PAIN DESCRIPTION - LOCATION: LOCATION: ABDOMEN

## 2025-06-19 ASSESSMENT — PAIN DESCRIPTION - PAIN TYPE: TYPE: ACUTE PAIN

## 2025-06-19 NOTE — ED TRIAGE NOTES
Pt to ED from home for RLQ abd pain that began last night. Pt denies fever, denies N/V/D. Pt denies urinary symptoms.

## 2025-06-20 NOTE — H&P (VIEW-ONLY)
"Reason For Consult  R mid ureteral stone w mild hydro    History Of Present Illness  Mitch Connor is a 64 y.o. male, PMH HTN, LACEY, DVT, PE, Afib on Xarelto, presenting with R flank pain. Reports that it started suddenly last night. No f/c, n/v/d. No urinary symptoms. Pain is worse with twisting movements. Of note- has seen Dr. Morgan multiple times for stents and litho.    VSS, labs unremarkable, UA negative, CT A/P noting a 6x12mm stone in R proximal/mid ureter with mild hydro. For this finding, urology has been consulted.     Past Medical History  He has a past medical history of BPH (benign prostatic hyperplasia), Diabetes mellitus (Multi), DVT (deep venous thrombosis) (Multi), Hypertension, Sleep apnea, and Solitary pulmonary nodule.    Surgical History  He has a past surgical history that includes Ablation of dysrhythmic focus (09/25/2018) and Achilles tendon surgery (09/25/2018).     Social History  He reports that he has never smoked. He has never used smokeless tobacco. He reports that he does not currently use alcohol after a past usage of about 5.0 standard drinks of alcohol per week. He reports that he does not currently use drugs.    Family History  Family History[1]     Allergies  Metformin    Review of Systems  All systems have been reviewed and are negative except for what is mentioned in the HPI.     Physical Exam  Constitutional: A&Ox3, calm and cooperative, NAD  Eyes: EOMI, clear sclera   Cardiovascular: Normal rate and regular rhythm. No murmurs  Respiratory/Thorax: CTAB, on RA  Gastrointestinal: Abdomen soft, nontender  Genitourinary: Voiding independently. No CVA tenderness  Musculoskeletal: ROM intact  Extremities: No peripheral edema  Neurological: A&Ox3, No focal deficits   Psychological: Appropriate mood and behavior     Last Recorded Vitals  Blood pressure 157/77, pulse 82, temperature 35.7 °C (96.3 °F), temperature source Temporal, resp. rate 16, height 1.88 m (6' 2\"), weight (!) 175 kg " (385 lb), SpO2 99%.    Relevant Results  Results for orders placed or performed during the hospital encounter of 06/19/25 (from the past 24 hours)   CBC and Auto Differential   Result Value Ref Range    WBC 7.9 4.4 - 11.3 x10*3/uL    nRBC 0.0 0.0 - 0.0 /100 WBCs    RBC 5.31 4.50 - 5.90 x10*6/uL    Hemoglobin 14.1 13.5 - 17.5 g/dL    Hematocrit 45.1 41.0 - 52.0 %    MCV 85 80 - 100 fL    MCH 26.6 26.0 - 34.0 pg    MCHC 31.3 (L) 32.0 - 36.0 g/dL    RDW 12.9 11.5 - 14.5 %    Platelets 190 150 - 450 x10*3/uL    Neutrophils % 59.1 40.0 - 80.0 %    Immature Granulocytes %, Automated 0.3 0.0 - 0.9 %    Lymphocytes % 28.9 13.0 - 44.0 %    Monocytes % 9.0 2.0 - 10.0 %    Eosinophils % 1.9 0.0 - 6.0 %    Basophils % 0.8 0.0 - 2.0 %    Neutrophils Absolute 4.70 1.20 - 7.70 x10*3/uL    Immature Granulocytes Absolute, Automated 0.02 0.00 - 0.70 x10*3/uL    Lymphocytes Absolute 2.29 1.20 - 4.80 x10*3/uL    Monocytes Absolute 0.71 0.10 - 1.00 x10*3/uL    Eosinophils Absolute 0.15 0.00 - 0.70 x10*3/uL    Basophils Absolute 0.06 0.00 - 0.10 x10*3/uL   Comprehensive metabolic panel   Result Value Ref Range    Glucose 146 (H) 74 - 99 mg/dL    Sodium 138 136 - 145 mmol/L    Potassium 4.1 3.5 - 5.3 mmol/L    Chloride 104 98 - 107 mmol/L    Bicarbonate 26 21 - 32 mmol/L    Anion Gap 12 10 - 20 mmol/L    Urea Nitrogen 17 6 - 23 mg/dL    Creatinine 1.07 0.50 - 1.30 mg/dL    eGFR 78 >60 mL/min/1.73m*2    Calcium 9.1 8.6 - 10.3 mg/dL    Albumin 3.9 3.4 - 5.0 g/dL    Alkaline Phosphatase 69 33 - 136 U/L    Total Protein 6.7 6.4 - 8.2 g/dL    AST 11 9 - 39 U/L    Bilirubin, Total 1.2 0.0 - 1.2 mg/dL    ALT 11 10 - 52 U/L   Lipase   Result Value Ref Range    Lipase 36 9 - 82 U/L   Lactate   Result Value Ref Range    Lactate 1.5 0.4 - 2.0 mmol/L   Urinalysis with Reflex Culture and Microscopic   Result Value Ref Range    Color, Urine Light-Yellow Light-Yellow, Yellow, Dark-Yellow    Appearance, Urine Clear Clear    Specific Gravity, Urine >1.050  (N) 1.005 - 1.035    pH, Urine 5.5 5.0, 5.5, 6.0, 6.5, 7.0, 7.5, 8.0    Protein, Urine NEGATIVE NEGATIVE, 10 (TRACE), 20 (TRACE) mg/dL    Glucose, Urine 100 (1+) (A) Normal mg/dL    Blood, Urine 0.03 (TRACE) (A) NEGATIVE mg/dL    Ketones, Urine NEGATIVE NEGATIVE mg/dL    Bilirubin, Urine NEGATIVE NEGATIVE mg/dL    Urobilinogen, Urine Normal Normal mg/dL    Nitrite, Urine NEGATIVE NEGATIVE    Leukocyte Esterase, Urine NEGATIVE NEGATIVE   Urinalysis Microscopic   Result Value Ref Range    WBC, Urine 1-5 1-5, NONE /HPF    RBC, Urine 6-10 (A) NONE, 1-2, 3-5 /HPF    Mucus, Urine FEW Reference range not established. /LPF     CT abdomen pelvis w IV contrast   Final Result   6 x 12 mm calculus in the proximal to mid right ureter. Mild right   hydronephrosis.                  MACRO:   None        Signed by: Tamara Serra 6/19/2025 8:43 PM   Dictation workstation:   [id number]         Assessment/Plan     R mid ureteral stone w mild hydro    Offered patient to do stent with litho. Pt prefers to trial MET.    - Flomax  - Pain control  - Strain urine  - Follow up outpatient with Dr. Morgan in 1-2 weeks    Discussed with Dr. Guzman. Urology to sign off.      I spent 60 minutes in the professional and overall care of this patient.    Gabriele Clemons, APRN-CNP       [1]   Family History  Problem Relation Name Age of Onset    Colonic polyp Father      Colon cancer Father      Coronary artery disease Sister      Colonic polyp Brother      Colonic polyp Paternal Grandmother      Colon cancer Paternal Grandmother

## 2025-06-20 NOTE — CONSULTS
"Reason For Consult  R mid ureteral stone w mild hydro    History Of Present Illness  Mitch Connor is a 64 y.o. male, PMH HTN, LACEY, DVT, PE, Afib on Xarelto, presenting with R flank pain. Reports that it started suddenly last night. No f/c, n/v/d. No urinary symptoms. Pain is worse with twisting movements. Of note- has seen Dr. Morgan multiple times for stents and litho.    VSS, labs unremarkable, UA negative, CT A/P noting a 6x12mm stone in R proximal/mid ureter with mild hydro. For this finding, urology has been consulted.     Past Medical History  He has a past medical history of BPH (benign prostatic hyperplasia), Diabetes mellitus (Multi), DVT (deep venous thrombosis) (Multi), Hypertension, Sleep apnea, and Solitary pulmonary nodule.    Surgical History  He has a past surgical history that includes Ablation of dysrhythmic focus (09/25/2018) and Achilles tendon surgery (09/25/2018).     Social History  He reports that he has never smoked. He has never used smokeless tobacco. He reports that he does not currently use alcohol after a past usage of about 5.0 standard drinks of alcohol per week. He reports that he does not currently use drugs.    Family History  Family History[1]     Allergies  Metformin    Review of Systems  All systems have been reviewed and are negative except for what is mentioned in the HPI.     Physical Exam  Constitutional: A&Ox3, calm and cooperative, NAD  Eyes: EOMI, clear sclera   Cardiovascular: Normal rate and regular rhythm. No murmurs  Respiratory/Thorax: CTAB, on RA  Gastrointestinal: Abdomen soft, nontender  Genitourinary: Voiding independently. No CVA tenderness  Musculoskeletal: ROM intact  Extremities: No peripheral edema  Neurological: A&Ox3, No focal deficits   Psychological: Appropriate mood and behavior     Last Recorded Vitals  Blood pressure 157/77, pulse 82, temperature 35.7 °C (96.3 °F), temperature source Temporal, resp. rate 16, height 1.88 m (6' 2\"), weight (!) 175 kg " (385 lb), SpO2 99%.    Relevant Results  Results for orders placed or performed during the hospital encounter of 06/19/25 (from the past 24 hours)   CBC and Auto Differential   Result Value Ref Range    WBC 7.9 4.4 - 11.3 x10*3/uL    nRBC 0.0 0.0 - 0.0 /100 WBCs    RBC 5.31 4.50 - 5.90 x10*6/uL    Hemoglobin 14.1 13.5 - 17.5 g/dL    Hematocrit 45.1 41.0 - 52.0 %    MCV 85 80 - 100 fL    MCH 26.6 26.0 - 34.0 pg    MCHC 31.3 (L) 32.0 - 36.0 g/dL    RDW 12.9 11.5 - 14.5 %    Platelets 190 150 - 450 x10*3/uL    Neutrophils % 59.1 40.0 - 80.0 %    Immature Granulocytes %, Automated 0.3 0.0 - 0.9 %    Lymphocytes % 28.9 13.0 - 44.0 %    Monocytes % 9.0 2.0 - 10.0 %    Eosinophils % 1.9 0.0 - 6.0 %    Basophils % 0.8 0.0 - 2.0 %    Neutrophils Absolute 4.70 1.20 - 7.70 x10*3/uL    Immature Granulocytes Absolute, Automated 0.02 0.00 - 0.70 x10*3/uL    Lymphocytes Absolute 2.29 1.20 - 4.80 x10*3/uL    Monocytes Absolute 0.71 0.10 - 1.00 x10*3/uL    Eosinophils Absolute 0.15 0.00 - 0.70 x10*3/uL    Basophils Absolute 0.06 0.00 - 0.10 x10*3/uL   Comprehensive metabolic panel   Result Value Ref Range    Glucose 146 (H) 74 - 99 mg/dL    Sodium 138 136 - 145 mmol/L    Potassium 4.1 3.5 - 5.3 mmol/L    Chloride 104 98 - 107 mmol/L    Bicarbonate 26 21 - 32 mmol/L    Anion Gap 12 10 - 20 mmol/L    Urea Nitrogen 17 6 - 23 mg/dL    Creatinine 1.07 0.50 - 1.30 mg/dL    eGFR 78 >60 mL/min/1.73m*2    Calcium 9.1 8.6 - 10.3 mg/dL    Albumin 3.9 3.4 - 5.0 g/dL    Alkaline Phosphatase 69 33 - 136 U/L    Total Protein 6.7 6.4 - 8.2 g/dL    AST 11 9 - 39 U/L    Bilirubin, Total 1.2 0.0 - 1.2 mg/dL    ALT 11 10 - 52 U/L   Lipase   Result Value Ref Range    Lipase 36 9 - 82 U/L   Lactate   Result Value Ref Range    Lactate 1.5 0.4 - 2.0 mmol/L   Urinalysis with Reflex Culture and Microscopic   Result Value Ref Range    Color, Urine Light-Yellow Light-Yellow, Yellow, Dark-Yellow    Appearance, Urine Clear Clear    Specific Gravity, Urine >1.050  (N) 1.005 - 1.035    pH, Urine 5.5 5.0, 5.5, 6.0, 6.5, 7.0, 7.5, 8.0    Protein, Urine NEGATIVE NEGATIVE, 10 (TRACE), 20 (TRACE) mg/dL    Glucose, Urine 100 (1+) (A) Normal mg/dL    Blood, Urine 0.03 (TRACE) (A) NEGATIVE mg/dL    Ketones, Urine NEGATIVE NEGATIVE mg/dL    Bilirubin, Urine NEGATIVE NEGATIVE mg/dL    Urobilinogen, Urine Normal Normal mg/dL    Nitrite, Urine NEGATIVE NEGATIVE    Leukocyte Esterase, Urine NEGATIVE NEGATIVE   Urinalysis Microscopic   Result Value Ref Range    WBC, Urine 1-5 1-5, NONE /HPF    RBC, Urine 6-10 (A) NONE, 1-2, 3-5 /HPF    Mucus, Urine FEW Reference range not established. /LPF     CT abdomen pelvis w IV contrast   Final Result   6 x 12 mm calculus in the proximal to mid right ureter. Mild right   hydronephrosis.                  MACRO:   None        Signed by: Tamara Serra 6/19/2025 8:43 PM   Dictation workstation:   [id number]         Assessment/Plan     R mid ureteral stone w mild hydro    Offered patient to do stent with litho. Pt prefers to trial MET.    - Flomax  - Pain control  - Strain urine  - Follow up outpatient with Dr. Morgan in 1-2 weeks    Discussed with Dr. Guzman. Urology to sign off.      I spent 60 minutes in the professional and overall care of this patient.    Gabriele Clemons, APRN-CNP       [1]   Family History  Problem Relation Name Age of Onset    Colonic polyp Father      Colon cancer Father      Coronary artery disease Sister      Colonic polyp Brother      Colonic polyp Paternal Grandmother      Colon cancer Paternal Grandmother

## 2025-06-23 ENCOUNTER — ANESTHESIA EVENT (OUTPATIENT)
Dept: OPERATING ROOM | Facility: HOSPITAL | Age: 65
End: 2025-06-23
Payer: COMMERCIAL

## 2025-06-24 ENCOUNTER — APPOINTMENT (OUTPATIENT)
Dept: RADIOLOGY | Facility: HOSPITAL | Age: 65
End: 2025-06-24
Payer: COMMERCIAL

## 2025-06-24 ENCOUNTER — ANESTHESIA (OUTPATIENT)
Dept: OPERATING ROOM | Facility: HOSPITAL | Age: 65
End: 2025-06-24
Payer: COMMERCIAL

## 2025-06-24 ENCOUNTER — HOSPITAL ENCOUNTER (OUTPATIENT)
Facility: HOSPITAL | Age: 65
Setting detail: OUTPATIENT SURGERY
Discharge: HOME | End: 2025-06-24
Attending: UROLOGY | Admitting: UROLOGY
Payer: COMMERCIAL

## 2025-06-24 VITALS
DIASTOLIC BLOOD PRESSURE: 81 MMHG | SYSTOLIC BLOOD PRESSURE: 126 MMHG | HEART RATE: 65 BPM | BODY MASS INDEX: 49.36 KG/M2 | TEMPERATURE: 97.2 F | OXYGEN SATURATION: 97 % | WEIGHT: 315 LBS | RESPIRATION RATE: 15 BRPM

## 2025-06-24 DIAGNOSIS — N20.1 CALCULUS OF URETER: ICD-10-CM

## 2025-06-24 LAB — GLUCOSE BLD MANUAL STRIP-MCNC: 163 MG/DL (ref 74–99)

## 2025-06-24 PROCEDURE — C1894 INTRO/SHEATH, NON-LASER: HCPCS | Performed by: UROLOGY

## 2025-06-24 PROCEDURE — 3700000001 HC GENERAL ANESTHESIA TIME - INITIAL BASE CHARGE: Performed by: UROLOGY

## 2025-06-24 PROCEDURE — 2500000004 HC RX 250 GENERAL PHARMACY W/ HCPCS (ALT 636 FOR OP/ED)

## 2025-06-24 PROCEDURE — 82947 ASSAY GLUCOSE BLOOD QUANT: CPT

## 2025-06-24 PROCEDURE — 3600000003 HC OR TIME - INITIAL BASE CHARGE - PROCEDURE LEVEL THREE: Performed by: UROLOGY

## 2025-06-24 PROCEDURE — A52356 PR CYSTO/URETERO W/LITHOTRIPSY  AND INDWELL STENT INSRT: Performed by: ANESTHESIOLOGY

## 2025-06-24 PROCEDURE — C1758 CATHETER, URETERAL: HCPCS | Performed by: UROLOGY

## 2025-06-24 PROCEDURE — 7100000009 HC PHASE TWO TIME - INITIAL BASE CHARGE: Performed by: UROLOGY

## 2025-06-24 PROCEDURE — 82365 CALCULUS SPECTROSCOPY: CPT | Performed by: UROLOGY

## 2025-06-24 PROCEDURE — C1747 HC OR 272 NO HCPCS: HCPCS | Performed by: UROLOGY

## 2025-06-24 PROCEDURE — 3700000002 HC GENERAL ANESTHESIA TIME - EACH INCREMENTAL 1 MINUTE: Performed by: UROLOGY

## 2025-06-24 PROCEDURE — C2617 STENT, NON-COR, TEM W/O DEL: HCPCS | Performed by: UROLOGY

## 2025-06-24 PROCEDURE — A52356 PR CYSTO/URETERO W/LITHOTRIPSY  AND INDWELL STENT INSRT

## 2025-06-24 PROCEDURE — 7100000001 HC RECOVERY ROOM TIME - INITIAL BASE CHARGE: Performed by: UROLOGY

## 2025-06-24 PROCEDURE — 2780000003 HC OR 278 NO HCPCS: Performed by: UROLOGY

## 2025-06-24 PROCEDURE — 7100000010 HC PHASE TWO TIME - EACH INCREMENTAL 1 MINUTE: Performed by: UROLOGY

## 2025-06-24 PROCEDURE — 2500000004 HC RX 250 GENERAL PHARMACY W/ HCPCS (ALT 636 FOR OP/ED): Performed by: UROLOGY

## 2025-06-24 PROCEDURE — 2720000007 HC OR 272 NO HCPCS: Performed by: UROLOGY

## 2025-06-24 PROCEDURE — 3600000008 HC OR TIME - EACH INCREMENTAL 1 MINUTE - PROCEDURE LEVEL THREE: Performed by: UROLOGY

## 2025-06-24 PROCEDURE — C1889 IMPLANT/INSERT DEVICE, NOC: HCPCS | Performed by: UROLOGY

## 2025-06-24 PROCEDURE — 7100000002 HC RECOVERY ROOM TIME - EACH INCREMENTAL 1 MINUTE: Performed by: UROLOGY

## 2025-06-24 RX ORDER — OXYCODONE HYDROCHLORIDE 5 MG/1
5 TABLET ORAL EVERY 4 HOURS PRN
Status: DISCONTINUED | OUTPATIENT
Start: 2025-06-24 | End: 2025-06-24 | Stop reason: HOSPADM

## 2025-06-24 RX ORDER — ROCURONIUM BROMIDE 10 MG/ML
INJECTION, SOLUTION INTRAVENOUS AS NEEDED
Status: DISCONTINUED | OUTPATIENT
Start: 2025-06-24 | End: 2025-06-24

## 2025-06-24 RX ORDER — CEFAZOLIN SODIUM IN 0.9 % NACL 3 G/100 ML
3 INTRAVENOUS SOLUTION, PIGGYBACK (ML) INTRAVENOUS ONCE
Status: COMPLETED | OUTPATIENT
Start: 2025-06-24 | End: 2025-06-24

## 2025-06-24 RX ORDER — ONDANSETRON HYDROCHLORIDE 2 MG/ML
INJECTION, SOLUTION INTRAVENOUS AS NEEDED
Status: DISCONTINUED | OUTPATIENT
Start: 2025-06-24 | End: 2025-06-24

## 2025-06-24 RX ORDER — LABETALOL HYDROCHLORIDE 5 MG/ML
5 INJECTION, SOLUTION INTRAVENOUS ONCE AS NEEDED
Status: DISCONTINUED | OUTPATIENT
Start: 2025-06-24 | End: 2025-06-24 | Stop reason: HOSPADM

## 2025-06-24 RX ORDER — LIDOCAINE HYDROCHLORIDE 10 MG/ML
0.1 INJECTION, SOLUTION EPIDURAL; INFILTRATION; INTRACAUDAL; PERINEURAL ONCE
Status: DISCONTINUED | OUTPATIENT
Start: 2025-06-24 | End: 2025-06-24 | Stop reason: HOSPADM

## 2025-06-24 RX ORDER — MEPERIDINE HYDROCHLORIDE 25 MG/ML
12.5 INJECTION INTRAMUSCULAR; INTRAVENOUS; SUBCUTANEOUS EVERY 10 MIN PRN
Status: DISCONTINUED | OUTPATIENT
Start: 2025-06-24 | End: 2025-06-24 | Stop reason: HOSPADM

## 2025-06-24 RX ORDER — PROCHLORPERAZINE EDISYLATE 5 MG/ML
5 INJECTION INTRAMUSCULAR; INTRAVENOUS
Status: DISCONTINUED | OUTPATIENT
Start: 2025-06-24 | End: 2025-06-24 | Stop reason: HOSPADM

## 2025-06-24 RX ORDER — FENTANYL CITRATE 50 UG/ML
50 INJECTION, SOLUTION INTRAMUSCULAR; INTRAVENOUS EVERY 5 MIN PRN
Status: DISCONTINUED | OUTPATIENT
Start: 2025-06-24 | End: 2025-06-24 | Stop reason: HOSPADM

## 2025-06-24 RX ORDER — FENTANYL CITRATE 50 UG/ML
25 INJECTION, SOLUTION INTRAMUSCULAR; INTRAVENOUS EVERY 5 MIN PRN
Status: DISCONTINUED | OUTPATIENT
Start: 2025-06-24 | End: 2025-06-24 | Stop reason: HOSPADM

## 2025-06-24 RX ORDER — SODIUM CHLORIDE, SODIUM LACTATE, POTASSIUM CHLORIDE, CALCIUM CHLORIDE 600; 310; 30; 20 MG/100ML; MG/100ML; MG/100ML; MG/100ML
INJECTION, SOLUTION INTRAVENOUS CONTINUOUS PRN
Status: DISCONTINUED | OUTPATIENT
Start: 2025-06-24 | End: 2025-06-24

## 2025-06-24 RX ORDER — ONDANSETRON HYDROCHLORIDE 2 MG/ML
4 INJECTION, SOLUTION INTRAVENOUS ONCE AS NEEDED
Status: DISCONTINUED | OUTPATIENT
Start: 2025-06-24 | End: 2025-06-24 | Stop reason: HOSPADM

## 2025-06-24 RX ORDER — MIDAZOLAM HYDROCHLORIDE 1 MG/ML
INJECTION, SOLUTION INTRAMUSCULAR; INTRAVENOUS AS NEEDED
Status: DISCONTINUED | OUTPATIENT
Start: 2025-06-24 | End: 2025-06-24

## 2025-06-24 RX ORDER — SODIUM CHLORIDE 9 MG/ML
20 INJECTION, SOLUTION INTRAVENOUS CONTINUOUS
Status: DISCONTINUED | OUTPATIENT
Start: 2025-06-24 | End: 2025-06-24 | Stop reason: HOSPADM

## 2025-06-24 RX ORDER — PROPOFOL 10 MG/ML
INJECTION, EMULSION INTRAVENOUS AS NEEDED
Status: DISCONTINUED | OUTPATIENT
Start: 2025-06-24 | End: 2025-06-24

## 2025-06-24 RX ORDER — LIDOCAINE HYDROCHLORIDE 10 MG/ML
INJECTION, SOLUTION EPIDURAL; INFILTRATION; INTRACAUDAL; PERINEURAL AS NEEDED
Status: DISCONTINUED | OUTPATIENT
Start: 2025-06-24 | End: 2025-06-24

## 2025-06-24 RX ORDER — FENTANYL CITRATE 50 UG/ML
INJECTION, SOLUTION INTRAMUSCULAR; INTRAVENOUS AS NEEDED
Status: DISCONTINUED | OUTPATIENT
Start: 2025-06-24 | End: 2025-06-24

## 2025-06-24 RX ADMIN — Medication 3 G: at 14:59

## 2025-06-24 RX ADMIN — LIDOCAINE HYDROCHLORIDE 5 ML: 10 INJECTION, SOLUTION EPIDURAL; INFILTRATION; INTRACAUDAL; PERINEURAL at 15:10

## 2025-06-24 RX ADMIN — ROCURONIUM BROMIDE 60 MG: 10 INJECTION, SOLUTION INTRAVENOUS at 15:10

## 2025-06-24 RX ADMIN — ONDANSETRON HYDROCHLORIDE 4 MG: 2 SOLUTION INTRAMUSCULAR; INTRAVENOUS at 15:22

## 2025-06-24 RX ADMIN — FENTANYL CITRATE 50 MCG: 0.05 INJECTION, SOLUTION INTRAMUSCULAR; INTRAVENOUS at 15:10

## 2025-06-24 RX ADMIN — DEXAMETHASONE SODIUM PHOSPHATE 4 MG: 4 INJECTION INTRA-ARTICULAR; INTRALESIONAL; INTRAMUSCULAR; INTRAVENOUS; SOFT TISSUE at 15:20

## 2025-06-24 RX ADMIN — SODIUM CHLORIDE, POTASSIUM CHLORIDE, SODIUM LACTATE AND CALCIUM CHLORIDE: 600; 310; 30; 20 INJECTION, SOLUTION INTRAVENOUS at 14:59

## 2025-06-24 RX ADMIN — SUGAMMADEX 400 MG: 100 INJECTION, SOLUTION INTRAVENOUS at 15:52

## 2025-06-24 RX ADMIN — PROPOFOL 200 MG: 10 INJECTION, EMULSION INTRAVENOUS at 15:10

## 2025-06-24 RX ADMIN — MIDAZOLAM 1 MG: 1 INJECTION INTRAMUSCULAR; INTRAVENOUS at 15:05

## 2025-06-24 ASSESSMENT — PAIN SCALES - GENERAL
PAINLEVEL_OUTOF10: 0 - NO PAIN
PAINLEVEL_OUTOF10: 6
PAINLEVEL_OUTOF10: 0 - NO PAIN

## 2025-06-24 ASSESSMENT — PAIN - FUNCTIONAL ASSESSMENT
PAIN_FUNCTIONAL_ASSESSMENT: 0-10

## 2025-06-24 NOTE — PERIOPERATIVE NURSING NOTE
Patient in Phase 2; dressed and up to chair with RN assist. Tolerating po fluids, no complaint of pain and no complaint of nausea.     Friend at bedside; discussed discharge instructions with patient and Friend. All questions at this time answered.     Discharge instructions provided using teachback method.  Patient's health-related risk factors discussed with patient.  Patient educated to look for worsening signs and symptoms and educated to seek medical attention if experiencing medical emergency.  Patient aware of needs to follow up with outpatient clinics as scheduled. Home going meds reviewed with patient.  Patient verbalized understanding of disposition and discharge instructions.  All questions answered to patient's satisfaction and within nursing scope of practice.    Patient clinically appropriate for discharge. Vitals stable/baseline.IV removed and patient transported to discharge area via wheelchair.

## 2025-06-24 NOTE — ANESTHESIA PREPROCEDURE EVALUATION
Patient: Mitch Connor    Procedure Information       Date/Time: 06/24/25 9029    Procedure: CYSTOSCOPY, WITH LASER LITHOTRIPSY *aoa* (URETEROSCOPY LASER LITHOTRIPSY  KEFZOL 2G) (Right)    Location: [facility] / [facility]    Surgeons: Ike Morgan MD            Relevant Problems   Cardiac   (+) Atrial flutter (Multi)   (+) Chronic atrial fibrillation (Multi)   (+) Essential hypertension   (+) Permanent atrial fibrillation (Multi)   (+) Persistent atrial fibrillation (Multi)      /Renal   (+) Benign prostatic hyperplasia with lower urinary tract symptoms   (+) Benign prostatic hyperplasia with urinary frequency   (+) Calculus of kidney   (+) Staghorn renal calculus      Endocrine   (+) Class 3 severe obesity due to excess calories with serious comorbidity and body mass index (BMI) of 45.0 to 49.9 in adult   (+) Class 3 severe obesity with serious comorbidity and body mass index (BMI) of 50.0 to 59.9 in adult   (+) Diabetes mellitus with diabetic neuropathy   (+) Morbid obesity (Multi)      Hematology   (+) DVT (deep venous thrombosis) (Multi)   (+) Left leg DVT (Multi)   (+) Long term (current) use of anticoagulants      Musculoskeletal   (+) Primary osteoarthritis of left hip     Echo 11/2023:  CONCLUSIONS:   1. Left ventricular systolic function is normal with a 55-60% estimated ejection fraction.   2. Spectral Doppler shows a pseudonormal pattern of left ventricular diastolic filling.    Clinical information reviewed:                   NPO Detail:  No data recorded     Physical Exam    Airway  Mallampati: II  TM distance: >3 FB  Neck ROM: full     Cardiovascular    Dental    Pulmonary    Abdominal            Anesthesia Plan    History of general anesthesia?: yes  History of complications of general anesthesia?: no    ASA 3     general     intravenous induction   Anesthetic plan and risks discussed with patient.    Plan discussed with CRNA.

## 2025-06-24 NOTE — DISCHARGE INSTRUCTIONS
Specks blood in the urine for 3 to 4 days.    Expect some mild right sided flank pain.    Use Tylenol or Advil for pain.    Expect some urinary frequency and urgency.    Remove the stent by pulling the string on Friday morning.

## 2025-06-24 NOTE — ANESTHESIA PROCEDURE NOTES
Airway  Date/Time: 6/24/2025 3:12 PM  Reason: elective    Airway not difficult    Staffing  Performed: CRNA   Authorized by: Jamila Garcia MD MPH    Performed by: JEFERSON De Los Santos-MIAN  Patient location during procedure: OR    Patient Condition  Indications for airway management: anesthesia  Patient position: sniffing  Sedation level: deep     Final Airway Details   Preoxygenated: yes  Final airway type: endotracheal airway  Successful airway: ETT  Cuffed: yes   Successful intubation technique: direct laryngoscopy  Adjuncts used in placement: intubating stylet  Endotracheal tube insertion site: oral  Blade: Alida  Blade size: #4  ETT size (mm): 8.0  Cormack-Lehane Classification: grade IIa - partial view of glottis  Placement verified by: chest auscultation and capnometry   Measured from: lips  ETT to lips (cm): 23  Number of attempts at approach: 1    Additional Comments  Atraumatic, dentition and soft tissue as preop following intubation, neck remained neutral throughout.

## 2025-06-24 NOTE — ANESTHESIA POSTPROCEDURE EVALUATION
Patient: Mitch Connor    Procedure Summary       Date: 06/24/25 Room / Location: [facility] / [facility]    Anesthesia Start: 1505 Anesthesia Stop: 1606    Procedure: CYSTOSCOPY, WITH LASER LITHOTRIPSY (Right) Diagnosis:       Calculus of ureter      (N20.1 RT URETERAL STONE)    Surgeons: Ike Morgan MD Responsible Provider: Joselo Lucia MD    Anesthesia Type: general ASA Status: 3            Anesthesia Type: general    Vitals Value Taken Time   /69 06/24/25 16:12   Temp 36.2 06/24/25 16:12   Pulse 71 06/24/25 16:03   Resp 15 06/24/25 16:03   SpO2 98 % 06/24/25 16:03       Anesthesia Post Evaluation    Patient location during evaluation: PACU  Patient participation: complete - patient participated  Level of consciousness: awake  Pain management: adequate  Airway patency: patent  Cardiovascular status: acceptable  Respiratory status: acceptable  Hydration status: acceptable  Postoperative Nausea and Vomiting: none        There were no known notable events for this encounter.

## 2025-06-24 NOTE — OP NOTE
CYSTOSCOPY, WITH LASER LITHOTRIPSY (R) Operative Note     Date: 2025  OR Location: [facility]    Name: Mitch Connor, : 1960, Age: 64 y.o., MRN: [id number], Sex: male    Diagnosis  Pre-op Diagnosis      * Calculus of ureter [N20.1] Post-op Diagnosis     * Calculus of ureter [N20.1]     Procedures  CYSTOSCOPY, WITH LASER LITHOTRIPSY  [address] - [place] CYSTO W/URETEROSCOPY W/LITHOTRIPSY      Surgeons      * Ike Morgan - Primary    Resident/Fellow/Other Assistant:  Surgeons and Role:  * No surgeons found with a matching role *    Staff:   Circulator: Jie Gonzalez Person: Maurisio    Anesthesia Staff: Anesthesiologist: Joselo Lucia MD  CRNA: JEFERSON De Los Santos-MIAN    Procedure Summary  Anesthesia: General  ASA: III  Estimated Blood Loss: 2 mL  Intra-op Medications:   Administrations occurring from 1345 to 1500 on 25:   Medication Name Total Dose   LR infusion Cannot be calculated   ceFAZolin (Ancef) 3 g in sodium chloride 0.9%  mL 3 g              Anesthesia Record               Intraprocedure I/O Totals       None           Specimen:   ID Type Source Tests Collected by Time   A :  Calculus Urine, Clean Catch CALCULI (STONE) ANALYSIS Ike Morgan MD 2025 1551                 Drains and/or Catheters: * None in log *    Tourniquet Times:         Implants:  Implants       Type Name Action Serial No.      Stent STENT, INLAY OPTIMA, 6FR X 26CM - [id number] Implanted               Findings: Stone in the mid ureter.    Indications: Mitch Connor is an 64 y.o. male who is having surgery for N20.1 RT URETERAL STONE.     The patient was seen in the preoperative area. The risks, benefits, complications, treatment options, non-operative alternatives, expected recovery and outcomes were discussed with the patient. The possibilities of reaction to medication, pulmonary aspiration, injury to surrounding structures, bleeding, recurrent infection, the need for additional procedures,  failure to diagnose a condition, and creating a complication requiring transfusion or operation were discussed with the patient. The patient concurred with the proposed plan, giving informed consent.  The site of surgery was properly noted/marked if necessary per policy. The patient has been actively warmed in preoperative area. Preoperative antibiotics have been ordered and given within 1 hours of incision. Venous thrombosis prophylaxis have been ordered including bilateral sequential compression devices    Procedure Details: Patient was taken to the operating room and placed under general anesthesia.  He was then placed in the dorsolithotomy position and prepped and draped in sterile manner.  Patient underwent cystoscopy.  This revealed a normal urethra and a moderately occlusive prostate.  Inspection of bladder revealed no stones, foreign bodies nor mucosal lesions.  The left ureteral orifice was normal.  The right ureteral orifice was visualized and cannulated with an open-ended flexible tip catheter.  A guidewire was placed up the ureter and into the kidney.  An attempt was made to pass the rigid ureteroscope but it did not pass very easily.  As a result I changed to a ureteral access sheath and then passed the flexible ureteroscope up to the level of the stone.  The stone was nicely fragmented into multiple very small fragments all of which should easily pass.  A couple of the fragments were sent off as specimens for analysis.  At the conclusion of the procedure there were just multiple very small fragments left.  All of these should easily pass.  I elected to place a double-J stent.  The guidewire was backloaded through the cystoscope and a 6 Kinyarwanda 26 cm double-J stent was placed with a good coil in the kidney and a good curl in the bladder.  The string was taped to the phallus.  The patient was awakened and taken to the recovery room in stable condition.  Evidence of Infection: No   Complications:  None;  patient tolerated the procedure well.    Disposition: PACU - hemodynamically stable.  Condition: stable                 Additional Details:     Attending Attestation: I performed the procedure.    Ike Morgan  Phone Number: [phone]

## 2025-06-25 ASSESSMENT — PAIN SCALES - GENERAL: PAINLEVEL_OUTOF10: 4

## 2025-06-28 LAB
APPEARANCE STONE: NORMAL
COMPN STONE: NORMAL
SPECIMEN WT: 11 MG

## 2025-06-28 NOTE — ED PROVIDER NOTES
HPI   Chief Complaint   Patient presents with    Abdominal Pain       HPI: 64-year-old male with a history of kidney stones presents for right lower quadrant pain that began last evening.  He says that the pain is sharp, intermittent and cites no other provocative or palliative factors.  He denies hematuria, dysuria, flank pain, fever and chills      Limitations to history: None  Independent Historians: Patient  External Records Reviewed: HIE, outpatient notes, inpatient notes  ------------------------------------------------------------------------------------------------------------------------------------------  ROS: a ten point review of systems was performed and was negative except as per HPI.  ------------------------------------------------------------------------------------------------------------------------------------------  PMH / PSH: as per HPI, otherwise reviewed in EMR  MEDS: as per HPI, otherwise reviewed in EMR  ALLERGIES: as per HPI, otherwise reviewed in EMR  SocH:  as per HPI, otherwise reviewed in EMR  FH:  as per HPI, otherwise reviewed in EMR  ------------------------------------------------------------------------------------------------------------------------------------------  Physical Exam:  VS: As documented in the triage note and EMR flowsheet from this visit was reviewed  General: Well appearing. No acute distress.   Eyes:  Extraocular movements grossly intact. No scleral icterus. No discharge  HEENT:  Normocephalic.  Atraumatic  Neck: Moves neck freely. No gross masses  CV: Regular rhythm. No murmurs, rubs or gallops   Resp: Clear to auscultation bilaterally. No respiratory distress.    GI: Soft, no masses. +RLQ pain and tenderness. No rebound tenderness or guarding  MSK: Symmetric muscle bulk. No deformities. No lower extremity edema.    Skin: Warm, dry, intact.   Neuro: No focal deficits.  A&O x3.   Psych: Appropriate for  situation  ------------------------------------------------------------------------------------------------------------------------------------------  Hospital Course / Medical Decision Making:  Independent Interpretations: CT abd / pelvis  EKG as interpreted by me: NA    MDM: 64-year-old male presents for right lower quadrant pain that began last evening.  He is afebrile and well-appearing.  He declined analgesics and antiemetics.  Urinalysis positive for blood without UTI.  No major abnormalities on CBC or CMP.  CT shows a 6 x 12 mm calculus in the proximal to mid right ureter.  The patient was assessed by the surgical GERRY who spoke to the on-call urologist who feels that if the patient is tolerating his pain he can follow-up as an outpatient with strict return precautions.  The patient does want to follow-up rather than be admitted.  He was provided with prescriptions for Toradol and Flomax.  He does follow with Dr. Morgan from urology.  He will follow-up with Dr. Morgan as soon as possible and will return to the ER for worsening of his pain, fever, chills or any other concerning symptoms.  He was provided with a copy of his CT report and was instructed to follow-up any additional findings with his primary care physician    Discussion of Management with Other Providers:   I discussed the patient/results with: Emergency medicine team    Final diagnosis and disposition as below.    Results for orders placed or performed during the hospital encounter of 06/19/25  -CBC and Auto Differential:   Collection Time: 06/19/25  7:22 PM       Result                      Value             Ref Range           WBC                         7.9               4.4 - 11.3 x*       nRBC                        0.0               0.0 - 0.0 /1*       RBC                         5.31              4.50 - 5.90 *       Hemoglobin                  14.1              13.5 - 17.5 *       Hematocrit                  45.1              41.0 - 52.0  %       MCV                         85                80 - 100 fL         MCH                         26.6              26.0 - 34.0 *       MCHC                        31.3 (L)          32.0 - 36.0 *       RDW                         12.9              11.5 - 14.5 %       Platelets                   190               150 - 450 x1*       Neutrophils %               59.1              40.0 - 80.0 %       Immature Granulocytes *     0.3               0.0 - 0.9 %         Lymphocytes %               28.9              13.0 - 44.0 %       Monocytes %                 9.0               2.0 - 10.0 %        Eosinophils %               1.9               0.0 - 6.0 %         Basophils %                 0.8               0.0 - 2.0 %         Neutrophils Absolute        4.70              1.20 - 7.70 *       Immature Granulocytes *     0.02              0.00 - 0.70 *       Lymphocytes Absolute        2.29              1.20 - 4.80 *       Monocytes Absolute          0.71              0.10 - 1.00 *       Eosinophils Absolute        0.15              0.00 - 0.70 *       Basophils Absolute          0.06              0.00 - 0.10 *  -Comprehensive metabolic panel:   Collection Time: 06/19/25  7:22 PM       Result                      Value             Ref Range           Glucose                     146 (H)           74 - 99 mg/dL       Sodium                      138               136 - 145 mm*       Potassium                   4.1               3.5 - 5.3 mm*       Chloride                    104               98 - 107 mmo*       Bicarbonate                 26                21 - 32 mmol*       Anion Gap                   12                10 - 20 mmol*       Urea Nitrogen               17                6 - 23 mg/dL        Creatinine                  1.07              0.50 - 1.30 *       eGFR                        78                >60 mL/min/1*       Calcium                     9.1               8.6 - 10.3 m*       Albumin                      3.9               3.4 - 5.0 g/*       Alkaline Phosphatase        69                33 - 136 U/L        Total Protein               6.7               6.4 - 8.2 g/*       AST                         11                9 - 39 U/L          Bilirubin, Total            1.2               0.0 - 1.2 mg*       ALT                         11                10 - 52 U/L    -Lipase:   Collection Time: 06/19/25  7:22 PM       Result                      Value             Ref Range           Lipase                      36                9 - 82 U/L     -Lactate:   Collection Time: 06/19/25  7:22 PM       Result                      Value             Ref Range           Lactate                     1.5               0.4 - 2.0 mm*  -Urinalysis with Reflex Culture and Microscopic:   Collection Time: 06/19/25  9:59 PM       Result                      Value             Ref Range           Color, Urine                Light-Yellow      Light-Yellow*       Appearance, Urine           Clear             Clear               Specific Gravity, Urine     >1.050 (N)        1.005 - 1.035       pH, Urine                   5.5               5.0, 5.5, 6.*       Protein, Urine              NEGATIVE          NEGATIVE, 10*       Glucose, Urine              100 (1+) (A)      Normal mg/dL        Blood, Urine                                  NEGATIVE mg/*   0.03 (TRACE) (A)       Ketones, Urine              NEGATIVE          NEGATIVE mg/*       Bilirubin, Urine            NEGATIVE          NEGATIVE mg/*       Urobilinogen, Urine         Normal            Normal mg/dL        Nitrite, Urine              NEGATIVE          NEGATIVE            Leukocyte Esterase, Ur*     NEGATIVE          NEGATIVE       -Urinalysis Microscopic:   Collection Time: 06/19/25  9:59 PM       Result                      Value             Ref Range           WBC, Urine                  1-5               1-5, NONE /H*       RBC, Urine                  6-10 (A)          NONE, 1-2,  3*       Mucus, Urine                FEW               Reference ra*  CT abdomen pelvis w IV contrast   Final Result    6 x 12 mm calculus in the proximal to mid right ureter. Mild right    hydronephrosis.                      MACRO:    None          Signed by: Tamara Serra 6/19/2025 8:43 PM    Dictation workstation:   [id number]                   Patient History   Medical History[1]  Surgical History[2]  Family History[3]  Social History[4]    Physical Exam   ED Triage Vitals [06/19/25 1858]   Temperature Heart Rate Respirations BP   35.7 °C (96.3 °F) 82 16 147/89      Pulse Ox Temp Source Heart Rate Source Patient Position   98 % Temporal -- --      BP Location FiO2 (%)     -- --       Physical Exam      ED Course & MDM   Diagnoses as of 06/28/25 1318   Ureteral calculus                 No data recorded     Ghanshyam Coma Scale Score: 15 (06/19/25 1923 : Jace Goins RN)                           Medical Decision Making      Procedure  Procedures       [1]   Past Medical History:  Diagnosis Date    BPH (benign prostatic hyperplasia)     Diabetes mellitus (Multi)     DVT (deep venous thrombosis) (Multi)     Hyperlipidemia     Hypertension     Sleep apnea     Solitary pulmonary nodule     Lung nodule    Type 2 diabetes mellitus    [2]   Past Surgical History:  Procedure Laterality Date    ABLATION OF DYSRHYTHMIC FOCUS  09/25/2018    Catheter Ablation    ACHILLES TENDON SURGERY  09/25/2018    Subcutaneous Tenotomy Of Achilles Tendon   [3]   Family History  Problem Relation Name Age of Onset    Colonic polyp Father      Colon cancer Father      Coronary artery disease Sister      Colonic polyp Brother      Colonic polyp Paternal Grandmother      Colon cancer Paternal Grandmother     [4]   Social History  Tobacco Use    Smoking status: Never    Smokeless tobacco: Never   Substance Use Topics    Alcohol use: Not Currently     Alcohol/week: 5.0 standard drinks of alcohol     Types: 5 Cans of beer per week    Drug  use: Not Currently        Pankaj Crowley,   06/28/25 1328

## 2025-07-18 DIAGNOSIS — I10 HYPERTENSION, UNSPECIFIED TYPE: ICD-10-CM

## 2025-07-18 DIAGNOSIS — I82.4Y2 DEEP VEIN THROMBOSIS (DVT) OF PROXIMAL VEIN OF LEFT LOWER EXTREMITY, UNSPECIFIED CHRONICITY: ICD-10-CM

## 2025-07-18 DIAGNOSIS — I48.21 PERMANENT ATRIAL FIBRILLATION (MULTI): ICD-10-CM

## 2025-07-24 RX ORDER — RIVAROXABAN 20 MG/1
TABLET, FILM COATED ORAL
Qty: 90 TABLET | Refills: 0 | Status: SHIPPED | OUTPATIENT
Start: 2025-07-24

## 2025-07-24 RX ORDER — METOPROLOL TARTRATE 50 MG/1
50 TABLET ORAL 2 TIMES DAILY
Qty: 180 TABLET | Refills: 0 | Status: SHIPPED | OUTPATIENT
Start: 2025-07-24

## 2025-09-02 ENCOUNTER — OFFICE VISIT (OUTPATIENT)
Dept: CARDIOLOGY | Facility: HOSPITAL | Age: 65
End: 2025-09-02
Payer: COMMERCIAL

## 2025-09-02 VITALS
BODY MASS INDEX: 40.43 KG/M2 | HEIGHT: 74 IN | OXYGEN SATURATION: 97 % | DIASTOLIC BLOOD PRESSURE: 77 MMHG | WEIGHT: 315 LBS | SYSTOLIC BLOOD PRESSURE: 130 MMHG | HEART RATE: 73 BPM

## 2025-09-02 DIAGNOSIS — I48.0 PAROXYSMAL ATRIAL FIBRILLATION (MULTI): ICD-10-CM

## 2025-09-02 DIAGNOSIS — E78.5 DYSLIPIDEMIA: Primary | ICD-10-CM

## 2025-09-02 DIAGNOSIS — I10 ESSENTIAL HYPERTENSION: ICD-10-CM

## 2025-09-02 LAB
ATRIAL RATE: 73 BPM
P AXIS: 80 DEGREES
P OFFSET: 198 MS
P ONSET: 146 MS
PR INTERVAL: 154 MS
Q ONSET: 223 MS
QRS COUNT: 12 BEATS
QRS DURATION: 82 MS
QT INTERVAL: 408 MS
QTC CALCULATION(BAZETT): 449 MS
QTC FREDERICIA: 435 MS
R AXIS: 8 DEGREES
T AXIS: 72 DEGREES
T OFFSET: 427 MS
VENTRICULAR RATE: 73 BPM

## 2025-09-02 PROCEDURE — 99214 OFFICE O/P EST MOD 30 MIN: CPT | Performed by: NURSE PRACTITIONER

## 2025-09-02 PROCEDURE — 3008F BODY MASS INDEX DOCD: CPT | Performed by: NURSE PRACTITIONER

## 2025-09-02 PROCEDURE — 99212 OFFICE O/P EST SF 10 MIN: CPT

## 2025-09-02 PROCEDURE — 93005 ELECTROCARDIOGRAM TRACING: CPT | Performed by: NURSE PRACTITIONER

## 2025-09-02 PROCEDURE — 3075F SYST BP GE 130 - 139MM HG: CPT | Performed by: NURSE PRACTITIONER

## 2025-09-02 PROCEDURE — 3078F DIAST BP <80 MM HG: CPT | Performed by: NURSE PRACTITIONER

## 2025-09-02 PROCEDURE — 1036F TOBACCO NON-USER: CPT | Performed by: NURSE PRACTITIONER

## (undated) DEVICE — Device

## (undated) DEVICE — BIOPSY PORT SEALS, SINGLE USE, 3 FR

## (undated) DEVICE — GLOVE, SURGICAL, PROTEXIS,  7.0, PF, LATEX

## (undated) DEVICE — CATHETER, URETERAL, OPEN END, 5 FR, 70 CM

## (undated) DEVICE — CATHETER, URETHRAL, FOLEY, 2 WAY, BARDEX LUBRICATH, SHORT LENGTH, 22 FR, 5 CC, LATEX

## (undated) DEVICE — IRRIGATION KIT, PUMPING, SINGLE ACTION, SYRINGE, 10 CC

## (undated) DEVICE — SOLUTION, IRRIGATION, USP, SODIUM CHLORIDE 0.9%, 3000 ML, BAG

## (undated) DEVICE — HOLMIUM 200 FORTEC FIBER

## (undated) DEVICE — COLLECTION BAG, FLUID, NON-STERILE

## (undated) DEVICE — URETERSCOPE, APTRA SCOPE, STANDARD DEFLECTION, SNGLE USE

## (undated) DEVICE — SINGLE-USE DIGITAL FLEXIBLE URETEROSCOPE

## (undated) DEVICE — PREP TRAY, BASIC

## (undated) DEVICE — CATHETER, URETERAL, POLLACK, OPEN END, 5.5 FR, 70 CM

## (undated) DEVICE — GLOVE, SURGICAL, PROTEXIS PI MICRO, 7.0, PF, LF

## (undated) DEVICE — MASK, AURASTRAIGHT, LARYN, SIZE 5

## (undated) DEVICE — BASKET, STONE 1.9 X 120 SKYLITE TIPLESS 12MM BASKET

## (undated) DEVICE — GUIDEWIRE, SOLO PLUS, STIFF, PTFE, 0.038 X 150CM, HYDRO/ STRT TIP

## (undated) DEVICE — HOLMIUM LASER LOW WATTAGE

## (undated) DEVICE — SHEATH, URETERAL ACCESS, NAVIGATOR 12/14FR X 36CM

## (undated) DEVICE — BAG, DRAINAGE, ANTI-REFLUX CHAMBER, 2000ML

## (undated) DEVICE — MASK, AURAGAIN, LARYN, SIZE 4.0